# Patient Record
Sex: MALE | Race: WHITE | Employment: OTHER | URBAN - METROPOLITAN AREA
[De-identification: names, ages, dates, MRNs, and addresses within clinical notes are randomized per-mention and may not be internally consistent; named-entity substitution may affect disease eponyms.]

---

## 2017-10-12 ENCOUNTER — OFFICE VISIT (OUTPATIENT)
Dept: INTERNAL MEDICINE CLINIC | Age: 32
End: 2017-10-12

## 2017-10-12 ENCOUNTER — HOSPITAL ENCOUNTER (OUTPATIENT)
Dept: OTHER | Age: 32
Discharge: HOME | End: 2017-10-12
Attending: INTERNAL MEDICINE | Admitting: INTERNAL MEDICINE

## 2017-10-12 VITALS
TEMPERATURE: 98.7 F | HEART RATE: 81 BPM | OXYGEN SATURATION: 98 % | SYSTOLIC BLOOD PRESSURE: 120 MMHG | HEIGHT: 71 IN | WEIGHT: 255.6 LBS | DIASTOLIC BLOOD PRESSURE: 80 MMHG | BODY MASS INDEX: 35.78 KG/M2

## 2017-10-12 DIAGNOSIS — K92.1 BLOOD IN STOOL: ICD-10-CM

## 2017-10-12 DIAGNOSIS — M43.10 SPONDYLOLISTHESIS, ACQUIRED: ICD-10-CM

## 2017-10-12 DIAGNOSIS — L70.0 CYSTIC ACNE: ICD-10-CM

## 2017-10-12 DIAGNOSIS — K60.1 CHRONIC RECTAL FISSURE: ICD-10-CM

## 2017-10-12 DIAGNOSIS — G56.21 NEURITIS OF RIGHT ULNAR NERVE: ICD-10-CM

## 2017-10-12 DIAGNOSIS — G56.21 NEURITIS OF RIGHT ULNAR NERVE: Primary | ICD-10-CM

## 2017-10-12 PROCEDURE — 99204 OFFICE O/P NEW MOD 45 MIN: CPT | Performed by: INTERNAL MEDICINE

## 2017-10-12 RX ORDER — MINOCYCLINE HYDROCHLORIDE 100 MG/1
CAPSULE ORAL
COMMUNITY
Start: 2016-09-21 | End: 2017-11-17 | Stop reason: ALTCHOICE

## 2017-10-12 RX ORDER — BETAMETHASONE DIPROPIONATE 0.5 MG/G
CREAM TOPICAL
COMMUNITY
Start: 2016-06-30 | End: 2018-08-21 | Stop reason: ALTCHOICE

## 2017-10-12 RX ORDER — ADAPALENE AND BENZOYL PEROXIDE 3; 25 MG/G; MG/G
1 GEL TOPICAL DAILY
Qty: 45 G | Refills: 5 | Status: SHIPPED | OUTPATIENT
Start: 2017-10-12 | End: 2017-11-17 | Stop reason: ALTCHOICE

## 2017-10-12 RX ORDER — METHYLPREDNISOLONE 4 MG/1
TABLET ORAL
Qty: 1 KIT | Refills: 0 | Status: SHIPPED | OUTPATIENT
Start: 2017-10-12 | End: 2017-10-18

## 2017-10-12 ASSESSMENT — PATIENT HEALTH QUESTIONNAIRE - PHQ9
SUM OF ALL RESPONSES TO PHQ QUESTIONS 1-9: 0
2. FEELING DOWN, DEPRESSED OR HOPELESS: 0
SUM OF ALL RESPONSES TO PHQ9 QUESTIONS 1 & 2: 0
1. LITTLE INTEREST OR PLEASURE IN DOING THINGS: 0

## 2017-10-12 ASSESSMENT — ENCOUNTER SYMPTOMS
HEMOPTYSIS: 0
EYE PAIN: 0
BACK PAIN: 1
EYE REDNESS: 0
BLURRED VISION: 0
RESPIRATORY NEGATIVE: 1
HEARTBURN: 0
EYE DISCHARGE: 0
COUGH: 0
GASTROINTESTINAL NEGATIVE: 1
NAUSEA: 0
CONSTIPATION: 0
VOMITING: 0
ABDOMINAL PAIN: 0
WHEEZING: 0
SPUTUM PRODUCTION: 0
SHORTNESS OF BREATH: 0
ORTHOPNEA: 0
DIARRHEA: 0
DOUBLE VISION: 0
SORE THROAT: 0
BLOOD IN STOOL: 0
STRIDOR: 0
EYES NEGATIVE: 1
PHOTOPHOBIA: 0

## 2017-10-12 NOTE — PATIENT INSTRUCTIONS
Care instructions adapted under license by [facility] ([facility]). If you have questions about a medical condition or this instruction, always ask your healthcare professional. [address] any warranty or liability for your use of this information.
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
## 2017-11-17 ENCOUNTER — PAT TELEPHONE (OUTPATIENT)
Dept: PREADMISSION TESTING | Age: 32
End: 2017-11-17

## 2017-11-17 VITALS — BODY MASS INDEX: 35 KG/M2 | WEIGHT: 250 LBS | HEIGHT: 71 IN

## 2017-11-17 NOTE — PRE-PROCEDURE INSTRUCTIONS
C-Difficile admission screening and protocol:     * Admitted with diarrhea? no     *Prior history of C-Diff. In last 3 months? no     *Antibiotic use in the past 6-8 weeks?no     *Prior hospitalization or nursing home in the last month?   no

## 2017-11-20 ENCOUNTER — HOSPITAL ENCOUNTER (OUTPATIENT)
Dept: ENDOSCOPY | Age: 32
Discharge: HOME | End: 2017-11-20
Attending: INTERNAL MEDICINE | Admitting: INTERNAL MEDICINE

## 2017-11-20 VITALS
BODY MASS INDEX: 35.39 KG/M2 | HEART RATE: 70 BPM | OXYGEN SATURATION: 97 % | WEIGHT: 252.76 LBS | HEIGHT: 71 IN | SYSTOLIC BLOOD PRESSURE: 137 MMHG | DIASTOLIC BLOOD PRESSURE: 97 MMHG | RESPIRATION RATE: 14 BRPM | TEMPERATURE: 97.8 F

## 2017-11-20 RX ORDER — ONDANSETRON 2 MG/ML
4 INJECTION INTRAMUSCULAR; INTRAVENOUS
Status: ACTIVE | OUTPATIENT
Start: 2017-11-20 | End: 2017-11-20

## 2017-11-20 RX ORDER — SODIUM CHLORIDE 0.9 % (FLUSH) 0.9 %
10 SYRINGE (ML) INJECTION EVERY 12 HOURS SCHEDULED
Status: DISCONTINUED | OUTPATIENT
Start: 2017-11-20 | End: 2017-11-21 | Stop reason: HOSPADM

## 2017-11-20 RX ORDER — MORPHINE SULFATE 2 MG/ML
2 INJECTION, SOLUTION INTRAMUSCULAR; INTRAVENOUS EVERY 5 MIN PRN
Status: DISCONTINUED | OUTPATIENT
Start: 2017-11-20 | End: 2017-11-21 | Stop reason: HOSPADM

## 2017-11-20 RX ORDER — OXYCODONE HYDROCHLORIDE AND ACETAMINOPHEN 5; 325 MG/1; MG/1
2 TABLET ORAL PRN
Status: ACTIVE | OUTPATIENT
Start: 2017-11-20 | End: 2017-11-20

## 2017-11-20 RX ORDER — FENTANYL CITRATE 50 UG/ML
25 INJECTION, SOLUTION INTRAMUSCULAR; INTRAVENOUS EVERY 5 MIN PRN
Status: DISCONTINUED | OUTPATIENT
Start: 2017-11-20 | End: 2017-11-21 | Stop reason: HOSPADM

## 2017-11-20 RX ORDER — MORPHINE SULFATE 2 MG/ML
1 INJECTION, SOLUTION INTRAMUSCULAR; INTRAVENOUS EVERY 5 MIN PRN
Status: DISCONTINUED | OUTPATIENT
Start: 2017-11-20 | End: 2017-11-21 | Stop reason: HOSPADM

## 2017-11-20 RX ORDER — SODIUM CHLORIDE 0.9 % (FLUSH) 0.9 %
10 SYRINGE (ML) INJECTION PRN
Status: DISCONTINUED | OUTPATIENT
Start: 2017-11-20 | End: 2017-11-21 | Stop reason: HOSPADM

## 2017-11-20 RX ORDER — SODIUM CHLORIDE 9 MG/ML
INJECTION, SOLUTION INTRAVENOUS CONTINUOUS
Status: DISCONTINUED | OUTPATIENT
Start: 2017-11-20 | End: 2017-11-21 | Stop reason: HOSPADM

## 2017-11-20 RX ORDER — FENTANYL CITRATE 50 UG/ML
50 INJECTION, SOLUTION INTRAMUSCULAR; INTRAVENOUS EVERY 5 MIN PRN
Status: DISCONTINUED | OUTPATIENT
Start: 2017-11-20 | End: 2017-11-21 | Stop reason: HOSPADM

## 2017-11-20 RX ORDER — OXYCODONE HYDROCHLORIDE AND ACETAMINOPHEN 5; 325 MG/1; MG/1
1 TABLET ORAL PRN
Status: ACTIVE | OUTPATIENT
Start: 2017-11-20 | End: 2017-11-20

## 2017-11-20 RX ORDER — MEPERIDINE HYDROCHLORIDE 25 MG/ML
12.5 INJECTION INTRAMUSCULAR; INTRAVENOUS; SUBCUTANEOUS EVERY 5 MIN PRN
Status: DISCONTINUED | OUTPATIENT
Start: 2017-11-20 | End: 2017-11-21 | Stop reason: HOSPADM

## 2017-11-20 RX ADMIN — SODIUM CHLORIDE: 9 INJECTION, SOLUTION INTRAVENOUS at 07:41

## 2017-11-20 ASSESSMENT — PAIN - FUNCTIONAL ASSESSMENT: PAIN_FUNCTIONAL_ASSESSMENT: 0-10

## 2017-11-20 ASSESSMENT — PAIN SCALES - GENERAL: PAINLEVEL_OUTOF10: 0

## 2017-11-20 NOTE — ANESTHESIA PRE-OP
Department of Anesthesiology  Preprocedure Note       Name:  Brie Monson   Age:  29 y.o.  :  1985                                          MRN:  [id number]         Date:  2017      [facility] Department of Anesthesiology  Pre-Anesthesia Evaluation/Consultation       Name:  Brie Monson                                         Age:  29 y.o. MRN:  [id number]           Procedure (Scheduled):  colonoscopy  Surgeon:  Dr. Kvng Gallegos Inhibitors Rash     Rash, no hx hives or angioedema per patient and he could not recall this    Advil [Ibuprofen] Itching     Patient Active Problem List   Diagnosis    Spondylolisthesis, acquired     History reviewed. No pertinent past medical history. Past Surgical History:   Procedure Laterality Date    DENTAL SURGERY      2 dental implants in incisors    HAND TENDON SURGERY Left 2010    left index and thumb    LAPAROSCOPIC APPENDECTOMY      LASIK  2016    SINUS ENDOSCOPY       Social History   Substance Use Topics    Smoking status: Former Smoker     Types: Cigarettes     Quit date: 2006    Smokeless tobacco: Current User     Types: Chew    Alcohol use Yes      Comment: drinks socially     Medications  Current Outpatient Prescriptions on File Prior to Encounter   Medication Sig Dispense Refill    nitroglycerin (RECTIV) 0.4 % rectal ointment Place 1 inch rectally every 12 hours 1 Tube 2    betamethasone dipropionate (DIPROLENE) 0.05 % cream Apply to affected areas on hands twice daily for 2 weeks then twice daily on weekends only as needed. No current facility-administered medications on file prior to encounter.       Current Outpatient Prescriptions   Medication Sig Dispense Refill    nitroglycerin (RECTIV) 0.4 % rectal ointment Place 1 inch rectally every 12 hours 1 Tube 2    betamethasone dipropionate (DIPROLENE) 0.05 % cream Apply to affected areas on hands twice daily for 2 weeks then twice daily on weekends only as needed.        Current Facility-Administered Medications   Medication Dose Route Frequency Provider Last Rate Last Dose    0.9 % sodium chloride infusion   Intravenous Continuous Pati Akins MD        sodium chloride flush 0.9 % injection 10 mL  10 mL Intravenous 2 times per day Pati Akins MD        sodium chloride flush 0.9 % injection 10 mL  10 mL Intravenous PRN Pati Akins MD        famotidine (PEPCID) injection 20 mg  20 mg Intravenous Once Pati Akins MD        fentaNYL (SUBLIMAZE) injection 25 mcg  25 mcg Intravenous Q5 Min PRN Jean Pierreboaz Carlin MD        fentaNYL (SUBLIMAZE) injection 50 mcg  50 mcg Intravenous Q5 Min PRN Jean Pierre Carlin MD        morphine (PF) injection 1 mg  1 mg Intravenous Q5 Min PRN Jean Pierre MD Tesfaye        morphine (PF) injection 2 mg  2 mg Intravenous Q5 Min PRN Jean Pierre MD Tesfaye        oxyCODONE-acetaminophen (PERCOCET) 5-325 MG per tablet 1 tablet  1 tablet Oral PRN Jean Pierre Carlin MD        Or    oxyCODONE-acetaminophen (PERCOCET) 5-325 MG per tablet 2 tablet  2 tablet Oral PRN Jean Pierre Carlin MD        ondansetron [facility]) injection 4 mg  4 mg Intravenous Once PRN Jean Pierre Carlin MD        meperidine (DEMEROL) injection 12.5 mg  12.5 mg Intravenous Q5 Min PRN Jean Pierre Carlin MD         Vital Signs (Current)   Vitals:    17   BP: 137/86   Pulse: 90   Resp: 16   Temp: 98.4 °F (36.9 °C)   SpO2: 96%     Vital Signs Statistics (for past 48 hrs)     Temp  Av.4 °F (36.9 °C)  Min: 98.4 °F (36.9 °C)   Min taken time: 17  Max: 98.4 °F (36.9 °C)   Max taken time: 17  Pulse  Av  Min: 80   Min taken time: 17  Max: 90   Max taken time: 17  Resp  Av  Min: 12   Min taken time: 17  Max: 16   Max taken time: 17  BP  Min: 137/86   Min taken time: 17 4874  Max: 137/86   Max taken time: Frequency Provider Last Rate Last Dose    0.9 % sodium chloride infusion   Intravenous Continuous Alyce Redman MD        sodium chloride flush 0.9 % injection 10 mL  10 mL Intravenous 2 times per day Alyce Redman MD        sodium chloride flush 0.9 % injection 10 mL  10 mL Intravenous PRN Alyce Redman MD        famotidine (PEPCID) injection 20 mg  20 mg Intravenous Once Alyce Redman MD        fentaNYL (SUBLIMAZE) injection 25 mcg  25 mcg Intravenous Q5 Min PRN Deja Hsu MD        fentaNYL (SUBLIMAZE) injection 50 mcg  50 mcg Intravenous Q5 Min PRN Deja Hsu MD        morphine (PF) injection 1 mg  1 mg Intravenous Q5 Min PRN Deja Hsu MD        morphine (PF) injection 2 mg  2 mg Intravenous Q5 Min PRN Deja Hsu MD        oxyCODONE-acetaminophen (PERCOCET) 5-325 MG per tablet 1 tablet  1 tablet Oral PRN Deja Hsu MD        Or    oxyCODONE-acetaminophen (PERCOCET) 5-325 MG per tablet 2 tablet  2 tablet Oral PRN Deja Hsu MD        ondansetron [facility]) injection 4 mg  4 mg Intravenous Once PRN Deja Hsu MD        meperidine (DEMEROL) injection 12.5 mg  12.5 mg Intravenous Q5 Min PRN Deja Hsu MD           Allergies: Allergies   Allergen Reactions    Roche-2 Inhibitors Rash     Rash, no hx hives or angioedema per patient and he could not recall this    Advil [Ibuprofen] Itching       Problem List:    Patient Active Problem List   Diagnosis Code    Spondylolisthesis, acquired M43.10       Past Medical History:  No past medical history on file.     Past Surgical History:        Procedure Laterality Date    DENTAL SURGERY  2007    2 dental implants in incisors    HAND TENDON SURGERY Left 2010    left index and thumb    LAPAROSCOPIC APPENDECTOMY  2002    LASIK  2016    SINUS ENDOSCOPY  2002       Social History:    Social History   Substance Use Topics    Smoking status: Former Smoker     Types: Cigarettes     Quit date: 11/17/2006    Smokeless tobacco: Current User     Types: Chew    Alcohol use Yes      Comment: drinks socially                                Ready to quit: Not Answered  Counseling given: Not Answered      Vital Signs (Current):   Vitals:    11/20/17 0723   BP: 137/86   Pulse: 90   Resp: 16   Temp: 98.4 °F (36.9 °C)   TempSrc: Temporal   SpO2: 96%   Weight: 252 lb 12.1 oz (114.7 kg)   Height: 5' 11\" (1.803 m)                                              BP Readings from Last 3 Encounters:   11/20/17 137/86   10/12/17 120/80       NPO Status:  prep 0400, see mar                                                                               BMI:   Wt Readings from Last 3 Encounters:   11/20/17 252 lb 12.1 oz (114.7 kg)   11/17/17 250 lb (113.4 kg)   10/12/17 255 lb 9.6 oz (115.9 kg)     Body mass index is 35.25 kg/m². Anesthesia Evaluation  Patient summary reviewed no history of anesthetic complications:   Airway: Mallampati: III  TM distance: >3 FB   Neck ROM: full  Mouth opening: < 3 FB Dental:          Pulmonary:Negative Pulmonary ROS breath sounds clear to auscultation                             Cardiovascular:Negative CV ROS            Rhythm: regular  Rate: normal           Beta Blocker:  Not on Beta Blocker         Neuro/Psych:   Negative Neuro/Psych ROS              GI/Hepatic/Renal:   (+) bowel prep,      (-) hiatal hernia, GERD, PUD, hepatitis and liver disease       Endo/Other: Negative Endo/Other ROS                    Abdominal:           Vascular: negative vascular ROS. Anesthesia Plan      MAC     ASA 2       Induction: intravenous. MIPS: Prophylactic antiemetics administered. Anesthetic plan and risks discussed with patient. Plan discussed with CRNA.               This pre-anesthesia assessment may be used as a history and physical.    DOS STAFF ADDENDUM:    Pt seen and examined, chart reviewed (including anesthesia, drug and allergy history). No interval changes to history and physical examination. Anesthetic plan, risks, benefits, alternatives, and personnel involved discussed with patient. Patient verbalized an understanding and agrees to proceed.       Shirley Huff MD  November 20, 2017  7:35 AM      Shirley Huff MD   11/20/2017

## 2017-11-20 NOTE — PROGRESS NOTES
Pt arrived to pacu from endo asleep on side ab soft. VSS on monitor. O2 off pt breathes easy on RA. Pt resting quietly in bed.

## 2017-11-20 NOTE — ANESTHESIA POST-OP
[facility] Department of Anesthesiology  Post-Anesthesia Note       Name:  Suleman Whitt                                         Age:  29 y.o.   MRN:  [id number]     Last Vitals & Oxygen Saturation: BP (!) 137/97   Pulse 70   Temp 97.8 °F (36.6 °C) (Temporal)   Resp 14   Ht 5' 11\" (1.803 m)   Wt 252 lb 12.1 oz (114.7 kg)   SpO2 97%   BMI 35.25 kg/m²   Patient Vitals for the past 4 hrs:   BP Temp Temp src Pulse Resp SpO2 Height Weight   11/20/17 0931 (!) 137/97 - - 70 14 - - -   11/20/17 0924 123/89 97.8 °F (36.6 °C) Temporal 69 16 97 % - -   11/20/17 0918 - 97.4 °F (36.3 °C) Temporal 64 14 96 % - -   11/20/17 0917 123/82 - - 65 22 97 % - -   11/20/17 0913 - - - 66 14 97 % - -   11/20/17 0912 124/79 - - 68 10 95 % - -   11/20/17 0911 - - - 67 18 96 % - -   11/20/17 0910 - - - 68 22 93 % - -   11/20/17 0907 119/78 - - 73 18 94 % - -   11/20/17 0906 - - - 69 14 95 % - -   11/20/17 0905 - - - 74 16 94 % - -   11/20/17 0904 123/81 - - 70 14 95 % - -   11/20/17 0903 - - - 73 16 95 % - -   11/20/17 0902 123/79 - - - 14 - - -   11/20/17 0901 - - - 69 19 95 % - -   11/20/17 0900 - - - 72 20 96 % - -   11/20/17 0859 - - - 73 16 95 % - -   11/20/17 0858 - - - 75 17 96 % - -   11/20/17 0857 (!) 123/59 - - 80 21 97 % - -   11/20/17 0856 - - - 83 18 97 % - -   11/20/17 0855 - - - 77 14 98 % - -   11/20/17 0854 - 97.5 °F (36.4 °C) Temporal - - - - -   11/20/17 0853 (!) 128/44 - - 81 12 96 % - -   11/20/17 0723 137/86 98.4 °F (36.9 °C) Temporal 90 16 96 % 5' 11\" (1.803 m) 252 lb 12.1 oz (114.7 kg)       Level of consciousness: awake, alert and oriented    Respiratory: stable     Cardiovascular: stable     Hydration: stable     PONV: stable     Post-op pain: adequate analgesia    Post-op assessment: no apparent anesthetic complications and tolerated procedure well    Complications:  none    Rosa Raman MD  November 20, 2017   10:49 AM

## 2017-11-20 NOTE — H&P
Pre-operative History and Physical    Patient: Michael Rush  : 1985  Acct#:     Intended Procedure:  Colonoscopy    HISTORY OF PRESENT ILLNESS:  The patient is a 29 y.o. male  who presents for/due to Rectal bleeding. Past Medical History:    No past medical history on file. Past Surgical History:        Procedure Laterality Date    DENTAL SURGERY      2 dental implants in incisors    HAND TENDON SURGERY Left 2010    left index and thumb    LAPAROSCOPIC APPENDECTOMY  2002    LASIK  2016    SINUS ENDOSCOPY       Medications Prior to Admission:   Current Outpatient Prescriptions on File Prior to Encounter   Medication Sig Dispense Refill    betamethasone dipropionate (DIPROLENE) 0.05 % cream Apply to affected areas on hands twice daily for 2 weeks then twice daily on weekends only as needed.  nitroglycerin (RECTIV) 0.4 % rectal ointment Place 1 inch rectally every 12 hours 1 Tube 2     No current facility-administered medications on file prior to encounter. Allergies:  Roche-2 inhibitors and Advil [ibuprofen]    Social History:   TOBACCO:   reports that he quit smoking about 11 years ago. His smoking use included Cigarettes. His smokeless tobacco use includes Chew. ETOH:   reports that he drinks alcohol. DRUGS:   reports that he does not use drugs. PHYSICAL EXAM:      Vital Signs: There were no vitals taken for this visit. Airway: No stridor or wheezing noted. Good air movement  Pulmonary: without wheezes.   Clear to auscultation  Cardiac:regular rate and rhythm without loud murmurs  Abdomen:soft, nontender,  Bowel sounds present    Pre-Procedure Assessment / Plan:  1) Colonoscopy    ASA Grade:  ASA 2 - Patient with mild systemic disease with no functional limitations  Mallampati Classification:  Class III    Level of Sedation Plan:Deep sedation    Post Procedure plan: Return to same level of care    I assessed the patient and find that the patient is in satisfactory

## 2017-11-20 NOTE — PROGRESS NOTES
1.  Patient is identified using name and date of birth. 2.  The patient is free from signs and symptoms of injury. 3.  The patient receives appropriate medication(s), safely administered during the perioperative period. 4.  The patient had wound/tissuue perfusion consistent with or improved from baseline levels established preoperatively. 5.  The patient is at or returning to normothermia at the conclusion of the immediate postoperative period. 6.  The patient's fluid, electrolyte, and acid base balances are consistent with or improved from baseline levels established preoperatively. 7.  The patient's pulmonary function is consistent with or improved from baseline levels established preoperatively. 8.  The patient's cardiovascular status is consistent with or improved from baseline levels established preoperatively. 9.  The patient/caregiver participates in decisions affecting his or her perioperative care. 10. The patient's care is consistent with the individualized perioperative plan of care. 11. The patient's right to privacy is maintained. 12. The patient is the recipient of competent and ethical care within legal standards of practice. 13.  The patient's value system, lifestyle, ethnicity, and culture are considered, respected, and incorporated in the perioperative plan of care. 14.  The patient demonstrates and/or reports adequate pain control throughout the perioperative period. 15. The patient's neurological status is consistent with or improved from baseline levels established preoperatively. 16.  The patient/caregiver demonstrates knowledge of the expected responses to the operative or invasive procedure. 16.  Patient/caregiver has reduced anxiety. Interventions - familiarize with environment and equipment.   Electronically signed by Manuel Ayala RN on 11/20/2017 at 8:53 AM

## 2017-12-13 ENCOUNTER — TELEPHONE (OUTPATIENT)
Dept: INTERNAL MEDICINE CLINIC | Age: 32
End: 2017-12-13

## 2017-12-13 NOTE — TELEPHONE ENCOUNTER
Pt states the medication below will cost over $600 and he cannot afford this. Asking if Dr Maya Kolb will order another medication for his acne. Explained we could not guarantee what insurance will cover but I would ask if Dr Maya Kolb can suggest another medication and we can see if insurance will cover. Please send to pharmacy below. Adapalene-Benzoyl Peroxide 0.3-2.5 % GEL;  Apply 1 actuation topically daily  Dispense: 45 g; Refill: [address] [facility], [address] [facility],First Floor - F [phone]

## 2017-12-14 NOTE — TELEPHONE ENCOUNTER
Spoke to patient, explained that screening diagnosis can only be used for people over the age of 48. Discussed other issues in regards to him appealing to his insurance company.

## 2018-02-16 ENCOUNTER — TELEPHONE (OUTPATIENT)
Dept: INTERNAL MEDICINE CLINIC | Age: 33
End: 2018-02-16

## 2018-02-16 ENCOUNTER — OFFICE VISIT (OUTPATIENT)
Dept: DERMATOLOGY | Age: 33
End: 2018-02-16

## 2018-02-16 DIAGNOSIS — L73.9 FOLLICULITIS: Primary | ICD-10-CM

## 2018-02-16 DIAGNOSIS — Z87.891 FORMER SMOKER: ICD-10-CM

## 2018-02-16 PROCEDURE — 99202 OFFICE O/P NEW SF 15 MIN: CPT | Performed by: DERMATOLOGY

## 2018-02-16 RX ORDER — DOXYCYCLINE HYCLATE 100 MG
TABLET ORAL
Qty: 60 TABLET | Refills: 1 | Status: SHIPPED | OUTPATIENT
Start: 2018-02-16 | End: 2018-04-20 | Stop reason: SDUPTHER

## 2018-02-16 RX ORDER — CHLORHEXIDINE GLUCONATE 4 G/100ML
SOLUTION TOPICAL
Qty: 236 ML | Refills: 1 | Status: SHIPPED | OUTPATIENT
Start: 2018-02-16 | End: 2018-03-02

## 2018-02-16 RX ORDER — CLINDAMYCIN PHOSPHATE 10 UG/ML
LOTION TOPICAL
Qty: 60 ML | Refills: 1 | Status: SHIPPED | OUTPATIENT
Start: 2018-02-16 | End: 2018-08-21 | Stop reason: ALTCHOICE

## 2018-04-20 ENCOUNTER — OFFICE VISIT (OUTPATIENT)
Dept: DERMATOLOGY | Age: 33
End: 2018-04-20

## 2018-04-20 DIAGNOSIS — L73.9 FOLLICULITIS: ICD-10-CM

## 2018-04-20 DIAGNOSIS — L70.0 ACNE VULGARIS: ICD-10-CM

## 2018-04-20 DIAGNOSIS — L21.9 SEBORRHEIC DERMATITIS: Primary | ICD-10-CM

## 2018-04-20 PROCEDURE — 99214 OFFICE O/P EST MOD 30 MIN: CPT | Performed by: DERMATOLOGY

## 2018-04-20 RX ORDER — DESONIDE 0.5 MG/G
CREAM TOPICAL
Qty: 15 G | Refills: 1 | Status: SHIPPED | OUTPATIENT
Start: 2018-04-20 | End: 2018-04-30 | Stop reason: ALTCHOICE

## 2018-04-20 RX ORDER — KETOCONAZOLE 20 MG/G
CREAM TOPICAL
Qty: 30 G | Refills: 1 | Status: SHIPPED | OUTPATIENT
Start: 2018-04-20 | End: 2018-08-21 | Stop reason: ALTCHOICE

## 2018-04-20 RX ORDER — DOXYCYCLINE HYCLATE 100 MG
TABLET ORAL
Qty: 60 TABLET | Refills: 2 | Status: SHIPPED | OUTPATIENT
Start: 2018-04-20 | End: 2018-07-23 | Stop reason: ALTCHOICE

## 2018-04-23 ENCOUNTER — TELEPHONE (OUTPATIENT)
Dept: DERMATOLOGY | Age: 33
End: 2018-04-23

## 2018-04-23 LAB
GRAM STAIN RESULT: NORMAL
WOUND/ABSCESS: NORMAL

## 2018-04-30 ENCOUNTER — TELEPHONE (OUTPATIENT)
Dept: DERMATOLOGY | Age: 33
End: 2018-04-30

## 2018-04-30 DIAGNOSIS — L21.9 SEBORRHEIC DERMATITIS: Primary | ICD-10-CM

## 2018-07-20 ENCOUNTER — OFFICE VISIT (OUTPATIENT)
Dept: DERMATOLOGY | Age: 33
End: 2018-07-20

## 2018-07-20 VITALS — BODY MASS INDEX: 35.01 KG/M2 | WEIGHT: 251 LBS

## 2018-07-20 DIAGNOSIS — L73.9 FOLLICULITIS: ICD-10-CM

## 2018-07-20 DIAGNOSIS — L70.0 ACNE VULGARIS: ICD-10-CM

## 2018-07-20 DIAGNOSIS — L70.0 CYSTIC ACNE: ICD-10-CM

## 2018-07-20 DIAGNOSIS — L70.0 ACNE VULGARIS: Primary | ICD-10-CM

## 2018-07-20 LAB
A/G RATIO: 1.9 (ref 1.1–2.2)
ALBUMIN SERPL-MCNC: 4.6 G/DL (ref 3.4–5)
ALP BLD-CCNC: 58 U/L (ref 40–129)
ALT SERPL-CCNC: 40 U/L (ref 10–40)
ANION GAP SERPL CALCULATED.3IONS-SCNC: 13 MMOL/L (ref 3–16)
AST SERPL-CCNC: 27 U/L (ref 15–37)
BASOPHILS ABSOLUTE: 0 K/UL (ref 0–0.2)
BASOPHILS RELATIVE PERCENT: 0.6 %
BILIRUB SERPL-MCNC: 0.6 MG/DL (ref 0–1)
BUN BLDV-MCNC: 19 MG/DL (ref 7–20)
CALCIUM SERPL-MCNC: 9.6 MG/DL (ref 8.3–10.6)
CHLORIDE BLD-SCNC: 106 MMOL/L (ref 99–110)
CHOLESTEROL, TOTAL: 216 MG/DL (ref 0–199)
CO2: 25 MMOL/L (ref 21–32)
CREAT SERPL-MCNC: 1 MG/DL (ref 0.9–1.3)
EOSINOPHILS ABSOLUTE: 0 K/UL (ref 0–0.6)
EOSINOPHILS RELATIVE PERCENT: 0.6 %
GFR AFRICAN AMERICAN: >60
GFR NON-AFRICAN AMERICAN: >60
GLOBULIN: 2.4 G/DL
GLUCOSE BLD-MCNC: 98 MG/DL (ref 70–99)
HCT VFR BLD CALC: 46.7 % (ref 40.5–52.5)
HDLC SERPL-MCNC: 46 MG/DL (ref 40–60)
HEMOGLOBIN: 15.8 G/DL (ref 13.5–17.5)
LDL CHOLESTEROL CALCULATED: 146 MG/DL
LYMPHOCYTES ABSOLUTE: 1.7 K/UL (ref 1–5.1)
LYMPHOCYTES RELATIVE PERCENT: 28.1 %
MCH RBC QN AUTO: 31.6 PG (ref 26–34)
MCHC RBC AUTO-ENTMCNC: 33.8 G/DL (ref 31–36)
MCV RBC AUTO: 93.7 FL (ref 80–100)
MONOCYTES ABSOLUTE: 0.5 K/UL (ref 0–1.3)
MONOCYTES RELATIVE PERCENT: 9 %
NEUTROPHILS ABSOLUTE: 3.7 K/UL (ref 1.7–7.7)
NEUTROPHILS RELATIVE PERCENT: 61.7 %
PDW BLD-RTO: 12.9 % (ref 12.4–15.4)
PLATELET # BLD: 211 K/UL (ref 135–450)
PMV BLD AUTO: 8.1 FL (ref 5–10.5)
POTASSIUM SERPL-SCNC: 3.9 MMOL/L (ref 3.5–5.1)
RBC # BLD: 4.99 M/UL (ref 4.2–5.9)
SODIUM BLD-SCNC: 144 MMOL/L (ref 136–145)
TOTAL PROTEIN: 7 G/DL (ref 6.4–8.2)
TRIGL SERPL-MCNC: 121 MG/DL (ref 0–150)
VLDLC SERPL CALC-MCNC: 24 MG/DL
WBC # BLD: 6 K/UL (ref 4–11)

## 2018-07-20 PROCEDURE — 99214 OFFICE O/P EST MOD 30 MIN: CPT | Performed by: DERMATOLOGY

## 2018-07-20 NOTE — PROGRESS NOTES
[facility]) Dermatology  Gerson Gomez DO, [address]       Seema Lopez  1985    29 y.o. male     Date of Visit: 2018    Chief Complaint:   Chief Complaint   Patient presents with    Follow-up    Acne     pt states ther is some improvement but still having break outs (pt unsure of which creams he is currently using)    Other     Folliculitis-no improvement        I was asked to see this patient by Dr. Kaplan ref. provider found. History of Present Illness:  Seema Lopez is a 29 y.o. male who presents with the chief complaint of follow up for the followin. Three-month follow-up for acne and scalp folliculitis. He has noticed decreased cystic like lesions while on doxycycline but still experiences acneiform flares monthly to his back, chest, posterior neck, shoulders, face. Also using sodium sulfacetamide 10-2% foam to his face, shoulders, back daily without much improvement. He would like to discuss oral isotretinoin therapy and start treatment if possible. Review of Systems:  Constitutional: Reports general sense of well-being   Skin: No new or changing moles, no history of keloids or hypertrophic scars. Heme: No abnormal bruising or bleeding. GI: Denies abdominal pain, N/V/D, bloody stools  Psych: denies depression or SI   Endo: denies hx diabetes or hyperglycemia    PMHx: Denies hx of IBD, depression or suicidal ideations. Past Medical History, Family History, Surgical History, Medications and Allergies reviewed. Past Skin Hx:   Patient denies past history of melanoma, NMSC, dysplastic nevi, hx of failure of multiple medications to clear acne    Family History   Problem Relation Age of Onset    Cancer Mother         pre cancer- skin    Colon Cancer Maternal Grandfather     Heart Disease Maternal Uncle      History reviewed. No pertinent past medical history.   Past Surgical History:   Procedure Laterality Date    COLONOSCOPY  2017    Colonoscopy with COMPREHENSIVE METABOLIC PANEL; Future  - LIPID PANEL; Future      2. Folliculitis  stable  Wound culture was negative  -Recommend patient only trim hairs to the occipital scalp, avoid close shave as this can aggravate folliculitis in this location. Oral isotretinoin may help to improve folliculitis   May apply clindamycin lotion to occipital scalp and affected areas at least daily and after his trims/shaves hairs. 25 minutes was spent with the patient discussing dx, tx options, prognosis, tests needed, reviewing labs, answering questions, and follow up at least 50% in face to face consultation. Note is transcribed using voice recognition software. Inadvertent computerized transcription errors may be present. Return in about 28 days (around 8/17/2018), or to 30 days, for accutane f/u.

## 2018-07-23 ENCOUNTER — TELEPHONE (OUTPATIENT)
Dept: DERMATOLOGY | Age: 33
End: 2018-07-23

## 2018-07-23 DIAGNOSIS — L70.0 ACNE VULGARIS: Primary | ICD-10-CM

## 2018-07-23 RX ORDER — ISOTRETINOIN 40 MG/1
40 CAPSULE ORAL DAILY
Qty: 30 CAPSULE | Refills: 0 | Status: SHIPPED | OUTPATIENT
Start: 2018-07-23 | End: 2018-08-21 | Stop reason: SDUPTHER

## 2018-07-23 NOTE — TELEPHONE ENCOUNTER
----- Message from Hoa French DO sent at 7/23/2018 12:15 PM EDT -----  Please remind patient to stop all other acne medications including the doxycyline and topical meds.

## 2018-07-25 ENCOUNTER — HOSPITAL ENCOUNTER (OUTPATIENT)
Dept: OTHER | Age: 33
Discharge: HOME | End: 2018-08-01
Attending: OBSTETRICS & GYNECOLOGY | Admitting: OBSTETRICS & GYNECOLOGY

## 2018-08-21 ENCOUNTER — HOSPITAL ENCOUNTER (OUTPATIENT)
Age: 33
Discharge: HOME | End: 2018-08-21
Payer: COMMERCIAL

## 2018-08-21 ENCOUNTER — TELEPHONE (OUTPATIENT)
Dept: DERMATOLOGY | Age: 33
End: 2018-08-21

## 2018-08-21 ENCOUNTER — OFFICE VISIT (OUTPATIENT)
Dept: DERMATOLOGY | Age: 33
End: 2018-08-21

## 2018-08-21 VITALS — BODY MASS INDEX: 35.73 KG/M2 | WEIGHT: 256.2 LBS

## 2018-08-21 DIAGNOSIS — L70.0 ACNE VULGARIS: ICD-10-CM

## 2018-08-21 DIAGNOSIS — L70.0 ACNE VULGARIS: Primary | ICD-10-CM

## 2018-08-21 DIAGNOSIS — L73.9 FOLLICULITIS: ICD-10-CM

## 2018-08-21 LAB
ALBUMIN SERPL-MCNC: 4.5 G/DL (ref 3.4–5)
ALP BLD-CCNC: 59 U/L (ref 40–129)
ALT SERPL-CCNC: 29 U/L (ref 10–40)
AST SERPL-CCNC: 24 U/L (ref 15–37)
BILIRUB SERPL-MCNC: 0.4 MG/DL (ref 0–1)
BILIRUBIN DIRECT: <0.2 MG/DL (ref 0–0.3)
BILIRUBIN, INDIRECT: NORMAL MG/DL (ref 0–1)
CHOLESTEROL, TOTAL: 251 MG/DL (ref 0–199)
HDLC SERPL-MCNC: 42 MG/DL (ref 40–60)
LDL CHOLESTEROL CALCULATED: 157 MG/DL
TOTAL PROTEIN: 7.5 G/DL (ref 6.4–8.2)
TRIGL SERPL-MCNC: 262 MG/DL (ref 0–150)
VLDLC SERPL CALC-MCNC: 52 MG/DL

## 2018-08-21 PROCEDURE — 36415 COLL VENOUS BLD VENIPUNCTURE: CPT

## 2018-08-21 PROCEDURE — 99213 OFFICE O/P EST LOW 20 MIN: CPT | Performed by: DERMATOLOGY

## 2018-08-21 PROCEDURE — 80076 HEPATIC FUNCTION PANEL: CPT

## 2018-08-21 PROCEDURE — 80061 LIPID PANEL: CPT

## 2018-08-21 RX ORDER — ISOTRETINOIN 40 MG/1
CAPSULE ORAL
Qty: 60 CAPSULE | Refills: 0 | Status: SHIPPED | OUTPATIENT
Start: 2018-08-21 | End: 2018-09-21 | Stop reason: SDUPTHER

## 2018-08-21 NOTE — PROGRESS NOTES
[facility]  Blanchard TAMARA Kitty Jeronim[address]indbonnie, [address]      Meagan Shepherdo  1985    34 y.o. male     Date of Visit: 8/21/2018    Chief Complaint:   Chief Complaint   Patient presents with    Follow-up    Acne     accutane, pt states he is happy with results except for dry skin       History of Present Illness:    Thirty-day follow-up for oral isotretinoin related to recalcitrant acne/folliculitis. Patient's been taking 40 mg daily and is noticing improvement to his acne already. Is experiencing dry skin, not moisturizing regularly. Complains of infrequent nose bleed since starting isotretinoin. Duration of isotretinoin therapy: 1 months. Dose: 40mg daily. Weight: 256lb (116.4kg)    Recent Labs: awaiting patient to complete labs after month 1. Baseline: total chol- 216, LDL-146, TG-121    Cumulative dose thus far: 1200mg    Total cummulative dose Goal:  120-150mg/kg; Based on patient's weight:  33,687-86,405LU    Isotretinoin prescription dates/dose:   7/23/18- 40mg QD      ROS:  Isotretinoin Symptom Survey:       Dry lips: Yes, using chapstick daily       Dry eyes: No         Dry skin: Yes, not moisturizing regularly       Muscle aches or Pains: No      Nose bleeds: Yes, applying neosporin     Frequent Headaches: No    Vision disturbances:No    Trouble with night vision: No     Paronychia: (ingrown toenails/ fingernails): No   Rash: No    Severe sun sensitivity or sunburn: No   Mood swings:  No       Depression: No        Suicidal thoughts: No     Past Medical History, Medications and Allergies reviewed. PMHx: Denies hx of IBD, depression or suicidal ideations, denies hx diabetes or hyperglycemia    History reviewed. No pertinent past medical history.   Past Surgical History:   Procedure Laterality Date    COLONOSCOPY  11/20/2017    Colonoscopy with polypectomy EMR (snare cautery/Elaview lift)    DENTAL SURGERY  2007    2 dental implants in incisors    HAND TENDON SURGERY Left 2010    left index and thumb    LAPAROSCOPIC APPENDECTOMY  2002    LASIK  2016    SINUS ENDOSCOPY  2002       Allergies   Allergen Reactions    Roche-2 Inhibitors Rash     Rash, no hx hives or angioedema per patient and he could not recall this    Advil [Ibuprofen] Itching     Outpatient Prescriptions Marked as Taking for the 8/21/18 encounter (Office Visit) with Kranthiernestina Valeria, DO   Medication Sig Dispense Refill    ISOtretinoin (ACCUTANE) 40 MG chemo capsule Take 1 capsule by mouth daily 30 capsule 0         Physical Examination       The following were examined and determined to be normal: Psych/Neuro, Conjunctivae/eyelids and Gums/teeth/lips. The following were examined and determined to be abnormal: Head/face, Neck, Breast/axilla/chest, Back, RUE and LUE. Well appearing. Areas of skin examined as listed above:  1. Back/shoulders- 2-3 inflammatory papules, no inflammatory pustules, 1-5 closed comedones, chest- 2-3 inflammatory papules, bilateral cheeks and chin with few scattered closed comedones  2. Posterior neck- one follicular nodulocystic lesion    Assessment and Plan     1. Acne vulgaris  Improved, tolerating isotretinoin well overall  Patient is to complete labs after his 1 month course of isotretinoin, awaiting completion and plan to review once received, if stable will plan for increase in dose to isotretinoin 40mg PO BID and confirm patient in IPledge    Discontinue use of Neosporin for nostrils and use only Vaseline daily p.r.n. to help decrease nosebleeds  Recommend Cerave thick moisturizing cream for her body and CeraVe PM moisturizing lotion for face daily p.r.n. Recommend the use of Vaseline for lips for moisturization    Reminded of side effects, no med sharing, no blood donation, no supplemental Vit A, no hair waxing. Continue to have Hepatic function panel and fasting lipid panel checked monthly as needed. RTC in 30 days.       2. Folliculitis  Improved  Continue oral isotretinoin as directed above.    Patient has discontinued use of topical clindamycin to scalp and neck

## 2018-08-22 NOTE — TELEPHONE ENCOUNTER
----- Message from Greson Gomez DO sent at 8/21/2018  5:50 PM EDT -----  Liver function within normal limits. Lipid panel reviewed- increase in triglycerides noted as well as an increase in LDL and total cholesterol. Please verify with patient that he had been fasting for 8 hours prior to lab draw and only drinking water during that timeframe. Labs will need to be monitored closely. Okay to increase daily dose to 40 mg twice daily. Will recheck labs in 1 month and he must be fasting for at least 8 hours. Please encourage patient to eat a healthy diet low in fat, exercise frequently and encourage weight loss.

## 2018-08-22 NOTE — TELEPHONE ENCOUNTER
Spoke to pt, he confirmed he was fasting for at least 8 hours and didn't have anything to drink during that timeframe, pt told to try to follow a healthy diet low in fat, exercise and encouraged weight loss. Pt understood.

## 2018-09-20 ENCOUNTER — HOSPITAL ENCOUNTER (OUTPATIENT)
Age: 33
Discharge: HOME | End: 2018-09-20
Payer: COMMERCIAL

## 2018-09-20 ENCOUNTER — OFFICE VISIT (OUTPATIENT)
Dept: DERMATOLOGY | Age: 33
End: 2018-09-20

## 2018-09-20 VITALS — BODY MASS INDEX: 36.12 KG/M2 | WEIGHT: 259 LBS

## 2018-09-20 DIAGNOSIS — L70.0 ACNE VULGARIS: Primary | ICD-10-CM

## 2018-09-20 DIAGNOSIS — L70.0 ACNE VULGARIS: ICD-10-CM

## 2018-09-20 DIAGNOSIS — L91.8 SKIN TAGS, MULTIPLE ACQUIRED: ICD-10-CM

## 2018-09-20 DIAGNOSIS — L73.9 FOLLICULITIS: ICD-10-CM

## 2018-09-20 LAB
ALBUMIN SERPL-MCNC: 4.8 G/DL (ref 3.4–5)
ALP BLD-CCNC: 64 U/L (ref 40–129)
ALT SERPL-CCNC: 31 U/L (ref 10–40)
AST SERPL-CCNC: 24 U/L (ref 15–37)
BILIRUB SERPL-MCNC: 0.5 MG/DL (ref 0–1)
BILIRUBIN DIRECT: <0.2 MG/DL (ref 0–0.3)
BILIRUBIN, INDIRECT: NORMAL MG/DL (ref 0–1)
CHOLESTEROL, TOTAL: 219 MG/DL (ref 0–199)
HDLC SERPL-MCNC: 43 MG/DL (ref 40–60)
LDL CHOLESTEROL CALCULATED: 151 MG/DL
TOTAL PROTEIN: 7.7 G/DL (ref 6.4–8.2)
TRIGL SERPL-MCNC: 126 MG/DL (ref 0–150)
VLDLC SERPL CALC-MCNC: 25 MG/DL

## 2018-09-20 PROCEDURE — 80061 LIPID PANEL: CPT

## 2018-09-20 PROCEDURE — 80076 HEPATIC FUNCTION PANEL: CPT

## 2018-09-20 PROCEDURE — 99213 OFFICE O/P EST LOW 20 MIN: CPT | Performed by: DERMATOLOGY

## 2018-09-20 PROCEDURE — 36415 COLL VENOUS BLD VENIPUNCTURE: CPT

## 2018-09-20 NOTE — PROGRESS NOTES
[facility]  Oral TAMARA Kumari Savita, [address]      Reyes Adames  1985    34 y.o. male     Date of Visit: 9/20/2018    Chief Complaint:   Chief Complaint   Patient presents with    Follow-up    Acne     accutane follow up - pt states symptoms are improving, just dry       History of Present Illness:    Thirty-day follow-up for oral isotretinoin related to recalcitrant acne/folliculitis. Patient's been taking 40 mg BID and is noticing improvement to his acne. Is experiencing dry skin But using the CeraVe moisturizing products and has noted improvement in dryness. Complains of infrequent nose bleed since starting isotretinoin but has been applying vaseline daily PRN So it is now \"tolerable. \"  He should also states he's noticed a few skin tags to his neck since he started Accutane and is assuming Accutane caused the skin tags. Denies any pain, bleeding, pruritus or irritation to the tags. Duration of isotretinoin therapy: 2 months. Dose: 40mg BID    Weight: 259lb (117.7kg)    Recent Labs: 8/21/18- Hepatic function panel within normal limits, Lipid panel- TGs-262, total chol-251, LDL- 157  Cumulative dose thus far: 3,600mg  July 1200mg   Aug 2400mg    Total cummulative dose Goal:  120-150mg/kg;  Based on patient's weight:  83,638-42,438PK    Isotretinoin prescription dates/dose:   7/23/18- 40mg QD  8/21/18-40mg b.i.d.      ROS:  Isotretinoin Symptom Survey:       Dry lips: Yes, using chapstick daily       Dry eyes: No         Dry skin: Yes, moisturizing with Cerave products      Muscle aches or Pains: No      Nose bleeds: yes, infrequently, using vaseline daily PRN     Frequent Headaches: No    Vision disturbances:No    Trouble with night vision: No     Paronychia: (ingrown toenails/ fingernails): No   Rash: No    Severe sun sensitivity or sunburn: No   Mood swings:  No       Depression: No        Suicidal thoughts: No     Past Medical History, Medications and Allergies reviewed. PMHx: Denies hx of IBD, depression or suicidal ideations, denies hx diabetes or hyperglycemia    History reviewed. No pertinent past medical history. Past Surgical History:   Procedure Laterality Date    COLONOSCOPY  11/20/2017    Colonoscopy with polypectomy EMR (snare cautery/Elaview lift)    DENTAL SURGERY  2007    2 dental implants in incisors    HAND TENDON SURGERY Left 2010    left index and thumb    LAPAROSCOPIC APPENDECTOMY  2002    LASIK  2016    SINUS ENDOSCOPY  2002       Allergies   Allergen Reactions    Roche-2 Inhibitors Rash     Rash, no hx hives or angioedema per patient and he could not recall this    Advil [Ibuprofen] Itching     Outpatient Prescriptions Marked as Taking for the 9/20/18 encounter (Office Visit) with Bethel Copeland,    Medication Sig Dispense Refill    ISOtretinoin (ACCUTANE) 40 MG chemo capsule Take 1 capsule p.o. b.i.d. 60 capsule 0    acetaminophen (APAP EXTRA STRENGTH) 500 MG tablet Take 1 tablet by mouth every 6 hours as needed for Pain 40 tablet 0    nitroglycerin (RECTIV) 0.4 % rectal ointment Place 1 inch rectally every 12 hours 1 Tube 2         Physical Examination       The following were examined and determined to be normal: Psych/Neuro, Conjunctivae/eyelids and Gums/teeth/lips. The following were examined and determined to be abnormal: Head/face, Neck, Breast/axilla/chest, Back, RUE and LUE. Well appearing. Areas of skin examined as listed above:  1. Back/shoulders-1-2 inflammatory papules, no inflammatory pustules or nodulocystic lesions, face, neck, chest-clear   2. Posterior neck- clear    Assessment and Plan     1. Acne vulgaris  Improved, tolerating isotretinoin well overall  Triglycerides increased significantly from baseline (146 to 262) pt states he was fasting at that time and has only had water to drink today.    Awaiting for completion of labs, will plan to review once received, if stable, will plan for continuation of

## 2018-09-21 DIAGNOSIS — L70.0 ACNE VULGARIS: ICD-10-CM

## 2018-09-21 RX ORDER — ISOTRETINOIN 40 MG/1
CAPSULE ORAL
Qty: 60 CAPSULE | Refills: 0 | Status: SHIPPED | OUTPATIENT
Start: 2018-09-21 | End: 2018-10-25 | Stop reason: SDUPTHER

## 2018-10-19 ENCOUNTER — OFFICE VISIT (OUTPATIENT)
Dept: DERMATOLOGY | Age: 33
End: 2018-10-19
Payer: COMMERCIAL

## 2018-10-19 VITALS — WEIGHT: 259 LBS | BODY MASS INDEX: 36.12 KG/M2

## 2018-10-19 DIAGNOSIS — L30.9 ECZEMA, UNSPECIFIED TYPE: ICD-10-CM

## 2018-10-19 DIAGNOSIS — L70.0 ACNE VULGARIS: Primary | ICD-10-CM

## 2018-10-19 PROCEDURE — 99213 OFFICE O/P EST LOW 20 MIN: CPT | Performed by: DERMATOLOGY

## 2018-10-23 DIAGNOSIS — L70.0 ACNE VULGARIS: ICD-10-CM

## 2018-10-23 LAB
ALBUMIN SERPL-MCNC: 4.3 G/DL (ref 3.4–5)
ALP BLD-CCNC: 82 U/L (ref 40–129)
ALT SERPL-CCNC: 28 U/L (ref 10–40)
AST SERPL-CCNC: 21 U/L (ref 15–37)
BILIRUB SERPL-MCNC: <0.2 MG/DL (ref 0–1)
BILIRUBIN DIRECT: <0.2 MG/DL (ref 0–0.3)
BILIRUBIN, INDIRECT: NORMAL MG/DL (ref 0–1)
CHOLESTEROL, TOTAL: 223 MG/DL (ref 0–199)
HDLC SERPL-MCNC: 38 MG/DL (ref 40–60)
LDL CHOLESTEROL CALCULATED: ABNORMAL MG/DL
LDL CHOLESTEROL DIRECT: 142 MG/DL
TOTAL PROTEIN: 6.9 G/DL (ref 6.4–8.2)
TRIGL SERPL-MCNC: 331 MG/DL (ref 0–150)
VLDLC SERPL CALC-MCNC: ABNORMAL MG/DL

## 2018-10-25 ENCOUNTER — TELEPHONE (OUTPATIENT)
Dept: DERMATOLOGY | Age: 33
End: 2018-10-25

## 2018-10-25 DIAGNOSIS — L70.0 ACNE VULGARIS: Primary | ICD-10-CM

## 2018-10-25 RX ORDER — ISOTRETINOIN 40 MG/1
CAPSULE ORAL
Qty: 60 CAPSULE | Refills: 0 | Status: SHIPPED | OUTPATIENT
Start: 2018-10-25 | End: 2018-11-19 | Stop reason: SDUPTHER

## 2018-10-25 NOTE — TELEPHONE ENCOUNTER
Pts Triglycerides elevated again, so asked  pt if he was fasting and had nothing but water to drink and he said he was fasting when he took this test. Told pt we are going to confirm him in iPledge but per JF we have to watch this number closely and strongly suggested pt get his labs done a few days before his visit so we can go over then and send rx in as pt calls every month wanting his refill before we've even had a chance to review his labs as he has them done after his visit.

## 2018-11-16 DIAGNOSIS — L70.0 ACNE VULGARIS: ICD-10-CM

## 2018-11-16 LAB
ALBUMIN SERPL-MCNC: 4.8 G/DL (ref 3.4–5)
ALP BLD-CCNC: 64 U/L (ref 40–129)
ALT SERPL-CCNC: 36 U/L (ref 10–40)
AST SERPL-CCNC: 26 U/L (ref 15–37)
BILIRUB SERPL-MCNC: 0.5 MG/DL (ref 0–1)
BILIRUBIN DIRECT: <0.2 MG/DL (ref 0–0.3)
BILIRUBIN, INDIRECT: NORMAL MG/DL (ref 0–1)
CHOLESTEROL, TOTAL: 228 MG/DL (ref 0–199)
HDLC SERPL-MCNC: 37 MG/DL (ref 40–60)
LDL CHOLESTEROL CALCULATED: 152 MG/DL
TOTAL PROTEIN: 7.1 G/DL (ref 6.4–8.2)
TRIGL SERPL-MCNC: 194 MG/DL (ref 0–150)
VLDLC SERPL CALC-MCNC: 39 MG/DL

## 2018-11-19 ENCOUNTER — OFFICE VISIT (OUTPATIENT)
Dept: DERMATOLOGY | Age: 33
End: 2018-11-19
Payer: COMMERCIAL

## 2018-11-19 VITALS — WEIGHT: 258.4 LBS | BODY MASS INDEX: 36.04 KG/M2

## 2018-11-19 DIAGNOSIS — L73.9 FOLLICULITIS: ICD-10-CM

## 2018-11-19 DIAGNOSIS — L30.8 OTHER ECZEMA: ICD-10-CM

## 2018-11-19 DIAGNOSIS — L85.3 XEROSIS CUTIS: ICD-10-CM

## 2018-11-19 DIAGNOSIS — L70.0 ACNE VULGARIS: Primary | ICD-10-CM

## 2018-11-19 PROCEDURE — 99213 OFFICE O/P EST LOW 20 MIN: CPT | Performed by: DERMATOLOGY

## 2018-11-19 RX ORDER — TRIAMCINOLONE ACETONIDE 1 MG/G
CREAM TOPICAL
Qty: 60 G | Refills: 1 | Status: SHIPPED | OUTPATIENT
Start: 2018-11-19 | End: 2020-01-30

## 2018-11-19 RX ORDER — ISOTRETINOIN 40 MG/1
CAPSULE ORAL
Qty: 60 CAPSULE | Refills: 0 | Status: SHIPPED | OUTPATIENT
Start: 2018-11-19 | End: 2018-12-20 | Stop reason: SDUPTHER

## 2018-12-19 DIAGNOSIS — L70.0 ACNE VULGARIS: ICD-10-CM

## 2018-12-19 LAB
CHOLESTEROL, TOTAL: 241 MG/DL (ref 0–199)
HDLC SERPL-MCNC: 39 MG/DL (ref 40–60)
LDL CHOLESTEROL CALCULATED: 151 MG/DL
TRIGL SERPL-MCNC: 256 MG/DL (ref 0–150)
VLDLC SERPL CALC-MCNC: 51 MG/DL

## 2018-12-20 ENCOUNTER — OFFICE VISIT (OUTPATIENT)
Dept: DERMATOLOGY | Age: 33
End: 2018-12-20
Payer: COMMERCIAL

## 2018-12-20 VITALS — BODY MASS INDEX: 38.04 KG/M2 | WEIGHT: 257.6 LBS

## 2018-12-20 DIAGNOSIS — L30.8 OTHER ECZEMA: ICD-10-CM

## 2018-12-20 DIAGNOSIS — L70.0 ACNE VULGARIS: Primary | ICD-10-CM

## 2018-12-20 DIAGNOSIS — L85.3 XEROSIS CUTIS: ICD-10-CM

## 2018-12-20 DIAGNOSIS — L73.9 FOLLICULITIS: ICD-10-CM

## 2018-12-20 PROCEDURE — 99213 OFFICE O/P EST LOW 20 MIN: CPT | Performed by: DERMATOLOGY

## 2018-12-20 RX ORDER — ISOTRETINOIN 40 MG/1
CAPSULE ORAL
Qty: 60 CAPSULE | Refills: 0 | Status: SHIPPED | OUTPATIENT
Start: 2018-12-20 | End: 2019-01-21 | Stop reason: SDUPTHER

## 2018-12-20 NOTE — PATIENT INSTRUCTIONS
· Apply Vaseline to cracking dry skin  · Continue 2 pills per day  · Lab work before next visit        [address]    1. Do not take more than 1 shower or bath each day. Try to spend 10 minutes or less in the shower/bath. 2. Use a moisturizing soap such as Dove for sensitive skin or Cetaphil. Antibacterial soaps can be too drying. 3. Use soap only on limited areas (face, underarms, groin). Try to avoid using soap on the arms, legs, trunk and back. 4. After showering, pat dry with a towel and generously apply a thick moisturizer all over. 5. If you are able, apply the moisturizer a second time during the day as well. 6. If a prescription cream or ointment has been ordered for you, apply the prescription medication to affected areas after you apply your moisturizer, the moisturizer absorbs best while skin is damp after your shower. 7. When it comes to moisturizers, the thicker the better. Ointments (such as vaseline) are thicker than creams, and creams are thicker than lotions. Suggested over-the-counter moisturizer:      CeraVe Moisturizing Cream in a jar/tub. Apply at least twice a day. Only CeraVe contains the three essential ceramides healthy skin needs.       CeraVe Facial Moisturizing Lotion with SPF 30 is great for the face and they make a night time cream without SPF in it as well

## 2019-01-21 ENCOUNTER — OFFICE VISIT (OUTPATIENT)
Dept: DERMATOLOGY | Age: 34
End: 2019-01-21
Payer: COMMERCIAL

## 2019-01-21 VITALS — BODY MASS INDEX: 37.51 KG/M2 | WEIGHT: 254 LBS

## 2019-01-21 DIAGNOSIS — L70.0 ACNE VULGARIS: ICD-10-CM

## 2019-01-21 DIAGNOSIS — L73.9 FOLLICULITIS: ICD-10-CM

## 2019-01-21 DIAGNOSIS — L30.8 OTHER ECZEMA: ICD-10-CM

## 2019-01-21 DIAGNOSIS — L70.0 ACNE VULGARIS: Primary | ICD-10-CM

## 2019-01-21 LAB
CHOLESTEROL, TOTAL: 211 MG/DL (ref 0–199)
HDLC SERPL-MCNC: 38 MG/DL (ref 40–60)
LDL CHOLESTEROL CALCULATED: 136 MG/DL
TRIGL SERPL-MCNC: 184 MG/DL (ref 0–150)
VLDLC SERPL CALC-MCNC: 37 MG/DL

## 2019-01-21 PROCEDURE — 99213 OFFICE O/P EST LOW 20 MIN: CPT | Performed by: DERMATOLOGY

## 2019-01-21 RX ORDER — ISOTRETINOIN 40 MG/1
CAPSULE ORAL
Qty: 60 CAPSULE | Refills: 0 | Status: SHIPPED | OUTPATIENT
Start: 2019-01-21 | End: 2020-01-30

## 2019-02-18 ENCOUNTER — OFFICE VISIT (OUTPATIENT)
Dept: DERMATOLOGY | Age: 34
End: 2019-02-18
Payer: COMMERCIAL

## 2019-02-18 VITALS — BODY MASS INDEX: 37.63 KG/M2 | WEIGHT: 254.8 LBS

## 2019-02-18 DIAGNOSIS — L73.9 FOLLICULITIS: ICD-10-CM

## 2019-02-18 DIAGNOSIS — L70.0 ACNE VULGARIS: Primary | ICD-10-CM

## 2019-02-18 DIAGNOSIS — L30.8 OTHER ECZEMA: ICD-10-CM

## 2019-02-18 PROCEDURE — 99213 OFFICE O/P EST LOW 20 MIN: CPT | Performed by: DERMATOLOGY

## 2019-10-23 ENCOUNTER — HOSPITAL ENCOUNTER (EMERGENCY)
Age: 34
Discharge: HOME | End: 2019-10-23
Attending: EMERGENCY MEDICINE
Payer: COMMERCIAL

## 2019-10-23 ENCOUNTER — APPOINTMENT (OUTPATIENT)
Dept: CT IMAGING | Age: 34
End: 2019-10-23
Payer: COMMERCIAL

## 2019-10-23 VITALS
RESPIRATION RATE: 12 BRPM | WEIGHT: 256.84 LBS | SYSTOLIC BLOOD PRESSURE: 107 MMHG | HEART RATE: 87 BPM | BODY MASS INDEX: 37.93 KG/M2 | OXYGEN SATURATION: 98 % | TEMPERATURE: 98.3 F | DIASTOLIC BLOOD PRESSURE: 60 MMHG

## 2019-10-23 DIAGNOSIS — A09 DIARRHEA OF INFECTIOUS ORIGIN: Primary | ICD-10-CM

## 2019-10-23 LAB
A/G RATIO: 1.5 (ref 1.1–2.2)
ALBUMIN SERPL-MCNC: 4.8 G/DL (ref 3.4–5)
ALP BLD-CCNC: 67 U/L (ref 40–129)
ALT SERPL-CCNC: 56 U/L (ref 10–40)
ANION GAP SERPL CALCULATED.3IONS-SCNC: 15 MMOL/L (ref 3–16)
AST SERPL-CCNC: 35 U/L (ref 15–37)
BASOPHILS ABSOLUTE: 0 K/UL (ref 0–0.2)
BASOPHILS RELATIVE PERCENT: 0.2 %
BILIRUB SERPL-MCNC: 0.8 MG/DL (ref 0–1)
BILIRUBIN URINE: NEGATIVE
BLOOD, URINE: ABNORMAL
BUN BLDV-MCNC: 18 MG/DL (ref 7–20)
C DIFF TOXIN/ANTIGEN: NORMAL
CALCIUM SERPL-MCNC: 9.3 MG/DL (ref 8.3–10.6)
CHLORIDE BLD-SCNC: 105 MMOL/L (ref 99–110)
CLARITY: CLEAR
CO2: 20 MMOL/L (ref 21–32)
COLOR: ABNORMAL
CREAT SERPL-MCNC: 0.9 MG/DL (ref 0.9–1.3)
EOSINOPHILS ABSOLUTE: 0 K/UL (ref 0–0.6)
EOSINOPHILS RELATIVE PERCENT: 0.2 %
EPITHELIAL CELLS, UA: 0 /HPF (ref 0–5)
GFR AFRICAN AMERICAN: >60
GFR NON-AFRICAN AMERICAN: >60
GLOBULIN: 3.1 G/DL
GLUCOSE BLD-MCNC: 128 MG/DL (ref 70–99)
GLUCOSE URINE: NEGATIVE MG/DL
HCT VFR BLD CALC: 49.7 % (ref 40.5–52.5)
HEMOGLOBIN: 17.2 G/DL (ref 13.5–17.5)
HYALINE CASTS: 3 /LPF (ref 0–8)
KETONES, URINE: NEGATIVE MG/DL
LEUKOCYTE ESTERASE, URINE: NEGATIVE
LIPASE: 14 U/L (ref 13–60)
LYMPHOCYTES ABSOLUTE: 0.2 K/UL (ref 1–5.1)
LYMPHOCYTES RELATIVE PERCENT: 2.2 %
MCH RBC QN AUTO: 32.6 PG (ref 26–34)
MCHC RBC AUTO-ENTMCNC: 34.7 G/DL (ref 31–36)
MCV RBC AUTO: 94 FL (ref 80–100)
MICROSCOPIC EXAMINATION: YES
MONOCYTES ABSOLUTE: 0.5 K/UL (ref 0–1.3)
MONOCYTES RELATIVE PERCENT: 4.3 %
NEUTROPHILS ABSOLUTE: 10 K/UL (ref 1.7–7.7)
NEUTROPHILS RELATIVE PERCENT: 93.1 %
NITRITE, URINE: NEGATIVE
PDW BLD-RTO: 12.8 % (ref 12.4–15.4)
PH UA: 5.5 (ref 5–8)
PLATELET # BLD: 204 K/UL (ref 135–450)
PMV BLD AUTO: 7.5 FL (ref 5–10.5)
POTASSIUM REFLEX MAGNESIUM: 4.4 MMOL/L (ref 3.5–5.1)
PROTEIN UA: NEGATIVE MG/DL
RBC # BLD: 5.29 M/UL (ref 4.2–5.9)
RBC UA: 8 /HPF (ref 0–4)
SODIUM BLD-SCNC: 140 MMOL/L (ref 136–145)
SPECIFIC GRAVITY UA: >1.03 (ref 1–1.03)
TOTAL PROTEIN: 7.9 G/DL (ref 6.4–8.2)
URINE REFLEX TO CULTURE: ABNORMAL
URINE TYPE: ABNORMAL
UROBILINOGEN, URINE: 0.2 E.U./DL
WBC # BLD: 10.7 K/UL (ref 4–11)
WBC UA: 1 /HPF (ref 0–5)

## 2019-10-23 PROCEDURE — 96375 TX/PRO/DX INJ NEW DRUG ADDON: CPT

## 2019-10-23 PROCEDURE — 74177 CT ABD & PELVIS W/CONTRAST: CPT

## 2019-10-23 PROCEDURE — 85025 COMPLETE CBC W/AUTO DIFF WBC: CPT

## 2019-10-23 PROCEDURE — 87505 NFCT AGENT DETECTION GI: CPT

## 2019-10-23 PROCEDURE — 80053 COMPREHEN METABOLIC PANEL: CPT

## 2019-10-23 PROCEDURE — 83690 ASSAY OF LIPASE: CPT

## 2019-10-23 PROCEDURE — 6360000002 HC RX W HCPCS: Performed by: EMERGENCY MEDICINE

## 2019-10-23 PROCEDURE — 2580000003 HC RX 258: Performed by: EMERGENCY MEDICINE

## 2019-10-23 PROCEDURE — 6370000000 HC RX 637 (ALT 250 FOR IP): Performed by: EMERGENCY MEDICINE

## 2019-10-23 PROCEDURE — 81001 URINALYSIS AUTO W/SCOPE: CPT

## 2019-10-23 PROCEDURE — 87449 NOS EACH ORGANISM AG IA: CPT

## 2019-10-23 PROCEDURE — 87425 ROTAVIRUS AG IA: CPT

## 2019-10-23 PROCEDURE — 99284 EMERGENCY DEPT VISIT MOD MDM: CPT

## 2019-10-23 PROCEDURE — 6360000004 HC RX CONTRAST MEDICATION: Performed by: EMERGENCY MEDICINE

## 2019-10-23 PROCEDURE — 87493 C DIFF AMPLIFIED PROBE: CPT

## 2019-10-23 PROCEDURE — 96374 THER/PROPH/DIAG INJ IV PUSH: CPT

## 2019-10-23 PROCEDURE — 87324 CLOSTRIDIUM AG IA: CPT

## 2019-10-23 RX ORDER — ONDANSETRON 4 MG/1
4 TABLET, ORALLY DISINTEGRATING ORAL EVERY 12 HOURS PRN
Qty: 12 TABLET | Refills: 0 | Status: SHIPPED | OUTPATIENT
Start: 2019-10-23 | End: 2020-01-30

## 2019-10-23 RX ORDER — MORPHINE SULFATE 4 MG/ML
4 INJECTION, SOLUTION INTRAMUSCULAR; INTRAVENOUS ONCE
Status: COMPLETED | OUTPATIENT
Start: 2019-10-23 | End: 2019-10-23

## 2019-10-23 RX ORDER — CIPROFLOXACIN 500 MG/1
750 TABLET, FILM COATED ORAL ONCE
Status: COMPLETED | OUTPATIENT
Start: 2019-10-23 | End: 2019-10-23

## 2019-10-23 RX ORDER — DICYCLOMINE HYDROCHLORIDE 10 MG/1
10 CAPSULE ORAL EVERY 6 HOURS PRN
Qty: 20 CAPSULE | Refills: 0 | Status: SHIPPED | OUTPATIENT
Start: 2019-10-23 | End: 2020-01-30

## 2019-10-23 RX ORDER — ONDANSETRON 2 MG/ML
4 INJECTION INTRAMUSCULAR; INTRAVENOUS ONCE
Status: COMPLETED | OUTPATIENT
Start: 2019-10-23 | End: 2019-10-23

## 2019-10-23 RX ORDER — 0.9 % SODIUM CHLORIDE 0.9 %
1000 INTRAVENOUS SOLUTION INTRAVENOUS ONCE
Status: COMPLETED | OUTPATIENT
Start: 2019-10-23 | End: 2019-10-23

## 2019-10-23 RX ADMIN — ONDANSETRON 4 MG: 2 INJECTION INTRAMUSCULAR; INTRAVENOUS at 16:28

## 2019-10-23 RX ADMIN — MORPHINE SULFATE 4 MG: 4 INJECTION, SOLUTION INTRAMUSCULAR; INTRAVENOUS at 16:29

## 2019-10-23 RX ADMIN — IOPAMIDOL 75 ML: 755 INJECTION, SOLUTION INTRAVENOUS at 16:11

## 2019-10-23 RX ADMIN — SODIUM CHLORIDE 1000 ML: 9 INJECTION, SOLUTION INTRAVENOUS at 16:28

## 2019-10-23 RX ADMIN — CIPROFLOXACIN 750 MG: 500 TABLET, FILM COATED ORAL at 18:09

## 2019-10-23 ASSESSMENT — ENCOUNTER SYMPTOMS
APNEA: 0
RECTAL PAIN: 0
STRIDOR: 0
WHEEZING: 0
ABDOMINAL DISTENTION: 0
ABDOMINAL PAIN: 1
DIARRHEA: 1
EYE ITCHING: 0
BACK PAIN: 0
CHEST TIGHTNESS: 0
COUGH: 0
PHOTOPHOBIA: 0
SHORTNESS OF BREATH: 0
EYE DISCHARGE: 0
VOMITING: 1
CONSTIPATION: 0
COLOR CHANGE: 0
EYE REDNESS: 0
NAUSEA: 1
CHOKING: 0
ANAL BLEEDING: 0
EYE PAIN: 0
BLOOD IN STOOL: 0

## 2019-10-23 ASSESSMENT — PAIN DESCRIPTION - INTENSITY: RATING_2: 5

## 2019-10-23 ASSESSMENT — PAIN DESCRIPTION - DURATION: DURATION_2: INTERMITTENT

## 2019-10-23 ASSESSMENT — PAIN DESCRIPTION - DESCRIPTORS
DESCRIPTORS_2: HEADACHE
DESCRIPTORS: DISCOMFORT

## 2019-10-23 ASSESSMENT — PAIN DESCRIPTION - LOCATION
LOCATION: ABDOMEN
LOCATION_2: HEAD

## 2019-10-23 ASSESSMENT — PAIN DESCRIPTION - PAIN TYPE
TYPE: ACUTE PAIN
TYPE_2: ACUTE PAIN

## 2019-10-23 ASSESSMENT — PAIN DESCRIPTION - ONSET: ONSET: ON-GOING

## 2019-10-23 ASSESSMENT — PAIN SCALES - GENERAL
PAINLEVEL_OUTOF10: 5
PAINLEVEL_OUTOF10: 5

## 2019-10-24 LAB
C. DIFFICILE TOXIN MOLECULAR: NORMAL
GI BACTERIAL PATHOGENS BY PCR: NORMAL

## 2019-10-25 LAB — ROTAVIRUS ANTIGEN: NEGATIVE

## 2020-04-12 ENCOUNTER — NURSE TRIAGE (OUTPATIENT)
Dept: OTHER | Facility: CLINIC | Age: 35
End: 2020-04-12

## 2020-04-12 ENCOUNTER — HOSPITAL ENCOUNTER (EMERGENCY)
Age: 35
Discharge: HOME | End: 2020-04-12
Payer: COMMERCIAL

## 2020-04-12 ENCOUNTER — APPOINTMENT (OUTPATIENT)
Dept: GENERAL RADIOLOGY | Age: 35
End: 2020-04-12
Payer: COMMERCIAL

## 2020-04-12 VITALS
SYSTOLIC BLOOD PRESSURE: 114 MMHG | OXYGEN SATURATION: 98 % | TEMPERATURE: 98 F | DIASTOLIC BLOOD PRESSURE: 49 MMHG | BODY MASS INDEX: 35.7 KG/M2 | HEIGHT: 71 IN | HEART RATE: 63 BPM | WEIGHT: 255 LBS | RESPIRATION RATE: 16 BRPM

## 2020-04-12 LAB
A/G RATIO: 1.5 (ref 1.1–2.2)
ALBUMIN SERPL-MCNC: 4.1 G/DL (ref 3.4–5)
ALP BLD-CCNC: 58 U/L (ref 40–129)
ALT SERPL-CCNC: 23 U/L (ref 10–40)
ANION GAP SERPL CALCULATED.3IONS-SCNC: 12 MMOL/L (ref 3–16)
AST SERPL-CCNC: 20 U/L (ref 15–37)
BASOPHILS ABSOLUTE: 0 K/UL (ref 0–0.2)
BASOPHILS RELATIVE PERCENT: 0.4 %
BILIRUB SERPL-MCNC: 0.5 MG/DL (ref 0–1)
BUN BLDV-MCNC: 13 MG/DL (ref 7–20)
C-REACTIVE PROTEIN: 3.2 MG/L (ref 0–5.1)
CALCIUM SERPL-MCNC: 9.4 MG/DL (ref 8.3–10.6)
CHLORIDE BLD-SCNC: 103 MMOL/L (ref 99–110)
CO2: 21 MMOL/L (ref 21–32)
CREAT SERPL-MCNC: 0.7 MG/DL (ref 0.9–1.3)
EOSINOPHILS ABSOLUTE: 0.1 K/UL (ref 0–0.6)
EOSINOPHILS RELATIVE PERCENT: 0.5 %
GFR AFRICAN AMERICAN: >60
GFR NON-AFRICAN AMERICAN: >60
GLOBULIN: 2.8 G/DL
GLUCOSE BLD-MCNC: 103 MG/DL (ref 70–99)
HCT VFR BLD CALC: 47.3 % (ref 40.5–52.5)
HEMOGLOBIN: 16 G/DL (ref 13.5–17.5)
LYMPHOCYTES ABSOLUTE: 1.3 K/UL (ref 1–5.1)
LYMPHOCYTES RELATIVE PERCENT: 12.4 %
MCH RBC QN AUTO: 31.4 PG (ref 26–34)
MCHC RBC AUTO-ENTMCNC: 33.8 G/DL (ref 31–36)
MCV RBC AUTO: 92.8 FL (ref 80–100)
MONOCYTES ABSOLUTE: 0.8 K/UL (ref 0–1.3)
MONOCYTES RELATIVE PERCENT: 7.4 %
NEUTROPHILS ABSOLUTE: 8.3 K/UL (ref 1.7–7.7)
NEUTROPHILS RELATIVE PERCENT: 79.3 %
PDW BLD-RTO: 13 % (ref 12.4–15.4)
PLATELET # BLD: 185 K/UL (ref 135–450)
PMV BLD AUTO: 7.5 FL (ref 5–10.5)
POTASSIUM REFLEX MAGNESIUM: 4.5 MMOL/L (ref 3.5–5.1)
RBC # BLD: 5.09 M/UL (ref 4.2–5.9)
REASON FOR REJECTION: NORMAL
REJECTED TEST: NORMAL
SEDIMENTATION RATE, ERYTHROCYTE: 5 MM/HR (ref 0–15)
SODIUM BLD-SCNC: 136 MMOL/L (ref 136–145)
TOTAL PROTEIN: 6.9 G/DL (ref 6.4–8.2)
URIC ACID, SERUM: 7.3 MG/DL (ref 3.5–7.2)
WBC # BLD: 10.5 K/UL (ref 4–11)

## 2020-04-12 PROCEDURE — 85652 RBC SED RATE AUTOMATED: CPT

## 2020-04-12 PROCEDURE — 99283 EMERGENCY DEPT VISIT LOW MDM: CPT

## 2020-04-12 PROCEDURE — 86140 C-REACTIVE PROTEIN: CPT

## 2020-04-12 PROCEDURE — 73562 X-RAY EXAM OF KNEE 3: CPT

## 2020-04-12 PROCEDURE — 6370000000 HC RX 637 (ALT 250 FOR IP): Performed by: NURSE PRACTITIONER

## 2020-04-12 PROCEDURE — 84550 ASSAY OF BLOOD/URIC ACID: CPT

## 2020-04-12 PROCEDURE — 85025 COMPLETE CBC W/AUTO DIFF WBC: CPT

## 2020-04-12 PROCEDURE — 80053 COMPREHEN METABOLIC PANEL: CPT

## 2020-04-12 PROCEDURE — 73560 X-RAY EXAM OF KNEE 1 OR 2: CPT

## 2020-04-12 RX ORDER — HYDROCODONE BITARTRATE AND ACETAMINOPHEN 5; 325 MG/1; MG/1
1 TABLET ORAL ONCE
Status: COMPLETED | OUTPATIENT
Start: 2020-04-12 | End: 2020-04-12

## 2020-04-12 RX ORDER — HYDROCODONE BITARTRATE AND ACETAMINOPHEN 10; 325 MG/1; MG/1
1 TABLET ORAL EVERY 8 HOURS PRN
Qty: 12 TABLET | Refills: 0 | Status: SHIPPED | OUTPATIENT
Start: 2020-04-12 | End: 2020-04-14

## 2020-04-12 RX ORDER — PREDNISONE 20 MG/1
40 TABLET ORAL DAILY
Qty: 10 TABLET | Refills: 0 | Status: SHIPPED | OUTPATIENT
Start: 2020-04-12 | End: 2020-04-12 | Stop reason: CLARIF

## 2020-04-12 RX ORDER — ALLOPURINOL 100 MG/1
100 TABLET ORAL DAILY
Qty: 30 TABLET | Refills: 0 | Status: SHIPPED | OUTPATIENT
Start: 2020-04-12 | End: 2020-04-13 | Stop reason: ALTCHOICE

## 2020-04-12 RX ADMIN — HYDROCODONE BITARTRATE AND ACETAMINOPHEN 1 TABLET: 5; 325 TABLET ORAL at 10:49

## 2020-04-12 RX ADMIN — HYDROCODONE BITARTRATE AND ACETAMINOPHEN 1 TABLET: 5; 325 TABLET ORAL at 08:21

## 2020-04-12 ASSESSMENT — PAIN SCALES - GENERAL
PAINLEVEL_OUTOF10: 7

## 2020-04-12 ASSESSMENT — PAIN DESCRIPTION - ORIENTATION
ORIENTATION: RIGHT
ORIENTATION: RIGHT

## 2020-04-12 ASSESSMENT — PAIN DESCRIPTION - DESCRIPTORS: DESCRIPTORS: THROBBING

## 2020-04-12 ASSESSMENT — PAIN DESCRIPTION - LOCATION
LOCATION: KNEE
LOCATION: KNEE

## 2020-04-12 ASSESSMENT — PAIN DESCRIPTION - PAIN TYPE: TYPE: ACUTE PAIN

## 2020-04-12 ASSESSMENT — PAIN - FUNCTIONAL ASSESSMENT: PAIN_FUNCTIONAL_ASSESSMENT: PREVENTS OR INTERFERES SOME ACTIVE ACTIVITIES AND ADLS

## 2020-04-12 ASSESSMENT — PAIN DESCRIPTION - FREQUENCY
FREQUENCY: CONTINUOUS
FREQUENCY: CONTINUOUS

## 2020-04-12 ASSESSMENT — PAIN DESCRIPTION - PROGRESSION: CLINICAL_PROGRESSION: GRADUALLY WORSENING

## 2020-04-12 ASSESSMENT — PAIN DESCRIPTION - ONSET: ONSET: SUDDEN

## 2020-04-12 NOTE — ED PROVIDER NOTES
MEDICAL HISTORY   History reviewed. No pertinent past medical history. SURGICAL HISTORY     Past Surgical History:   Procedure Laterality Date    COLONOSCOPY  2017    Colonoscopy with polypectomy EMR (snare cautery/Elaview lift)    DENTAL SURGERY      2 dental implants in incisors    HAND TENDON SURGERY Left 2010    left index and thumb    LAPAROSCOPIC APPENDECTOMY      LASIK      SINUS ENDOSCOPY           [address]       Discharge Medication List as of 2020 10:43 AM            ALLERGIES     Roche-2 inhibitors and Advil [ibuprofen]    FAMILYHISTORY       Family History   Problem Relation Age of Onset    Cancer Mother         pre cancer- skin    Colon Cancer Maternal Grandfather     Heart Disease Maternal Uncle           SOCIAL HISTORY       Social History     Tobacco Use    Smoking status: Former Smoker     Packs/day: 1.00     Years: 5.00     Pack years: 5.00     Types: Cigarettes     Last attempt to quit: 2006     Years since quittin.4    Smokeless tobacco: Current User     Types: Chew   Substance Use Topics    Alcohol use: Yes     Comment: drinks socially    Drug use: No       SCREENINGS             PHYSICAL EXAM    (up to 7 for level 4, 8 or more for level 5)     ED Triage Vitals [20 0743]   Enc Vitals Group      /75      Pulse 75      Resp 16      Temp 98.7 °F (37.1 °C)      Temp Source Oral      SpO2 97 %      Weight 255 lb (115.7 kg)      Height 5' 11\" (1.803 m)         Physical Exam  Vitals signs and nursing note reviewed. Constitutional:       General: He is awake. Appearance: Normal appearance. He is well-developed and normal weight. HENT:      Head: Normocephalic and atraumatic. Nose: Nose normal.   Eyes:      General:         Right eye: No discharge. Left eye: No discharge. Neck:      Musculoskeletal: Normal range of motion.    Cardiovascular:      Pulses:           Dorsalis pedis pulses are 2+ on the right studies reviewed. (See chart for details)   -  Patient seen and evaluated in the emergency department. -  Triage and nursing notes reviewed and incorporated. -  Old chart records reviewed and incorporated. -  Patient case discussed with attending physician, although Dr. Sheeba Westbrook was available for consultation.   -  Differential diagnosis includes:  Septic arthritis, gout, knee effusion, arthritis, tendinitis, vs Covid-19  -  Work-up included:  See above CBC, CMP, uric acid, ESR, CRP, x-ray right knee  -  ED treatment included:   Norco  - Consults: none  -  Results discussed with patient. Labs show ESR 5. CBC shows absolute neutrophil 8.3.. CMP with glucose 103, creatinine 0.7. CRP 3.2. Uric acid 7.3. X-ray right knee shows no acute osseous abnormality. Pt was given strict return precautions. The patient is agreeable with plan of care and disposition.  -  Disposition:  home  I offered patient NSAIDs for inflammation he said he cannot take as he is allergic to them as it causes a rash and itching. I offered an Ace wrap for support and comfort and crutches as needed for ambulation. Patient declined both of these. He was instructed to follow-up with PCP for further evaluation and testing. FINAL IMPRESSION      1.  Acute gout of right knee, unspecified cause          DISPOSITION/PLAN   DISPOSITION        PATIENT REFERREDTO:  Christine Juares MD  [facility] [address] [place] [address]  [phone]    Call in 2 days  As needed, If symptoms worsen    [facility]  [address] [facility]  [phone]  Go to   As needed      DISCHARGE MEDICATIONS:  Discharge Medication List as of 4/12/2020 10:43 AM      START taking these medications    Details   allopurinol (ZYLOPRIM) 100 MG tablet Take 1 tablet by mouth daily, Disp-30 tablet, R-0Print      HYDROcodone-acetaminophen (NORCO)  MG per tablet Take 1 tablet by mouth every 8 hours as needed for Pain for up to 12 doses. Intended supply: 30 days, Disp-12 tablet, R-0Print             DISCONTINUED MEDICATIONS:  Discharge Medication List as of 4/12/2020 10:43 AM            Periodic Controlled Substance Monitoring: Possible medication side effects, risk of tolerance/dependence & alternative treatments discussed.  (Tawanna Severance, APRN - CNP)    (Please note that portions of this note were completed with a voice recognition program.  Efforts were made to edit the dictations but occasionally words are mis-transcribed.)    Tawanna Severance, APRN - CNP (electronically signed)            Tawanna Severance, APRN - CNP  04/12/20 1404

## 2020-04-12 NOTE — ED TRIAGE NOTES
Suddenly developed right knee pain last evening. Knee red, swollen with constant throbbing. Denies injury.

## 2020-04-17 ENCOUNTER — TELEPHONE (OUTPATIENT)
Dept: ADMINISTRATIVE | Age: 35
End: 2020-04-17

## 2020-04-20 NOTE — TELEPHONE ENCOUNTER
Received DENIAL for Colchicine 0.6MG tablets. Denial letter attached. Please notify patient. Thank you.

## 2020-06-17 ENCOUNTER — OFFICE VISIT (OUTPATIENT)
Dept: RHEUMATOLOGY | Age: 35
End: 2020-06-17
Payer: COMMERCIAL

## 2020-06-17 VITALS
HEART RATE: 74 BPM | BODY MASS INDEX: 37.98 KG/M2 | HEIGHT: 69 IN | DIASTOLIC BLOOD PRESSURE: 73 MMHG | SYSTOLIC BLOOD PRESSURE: 105 MMHG | TEMPERATURE: 98.3 F | WEIGHT: 256.4 LBS

## 2020-06-17 DIAGNOSIS — M25.561 PAIN AND SWELLING OF RIGHT KNEE: ICD-10-CM

## 2020-06-17 DIAGNOSIS — M25.461 PAIN AND SWELLING OF RIGHT KNEE: ICD-10-CM

## 2020-06-17 LAB
A/G RATIO: 2.4 (ref 1.1–2.2)
ALBUMIN SERPL-MCNC: 4.7 G/DL (ref 3.4–5)
ALP BLD-CCNC: 61 U/L (ref 40–129)
ALT SERPL-CCNC: 28 U/L (ref 10–40)
ANION GAP SERPL CALCULATED.3IONS-SCNC: 9 MMOL/L (ref 3–16)
AST SERPL-CCNC: 25 U/L (ref 15–37)
BASOPHILS ABSOLUTE: 0 K/UL (ref 0–0.2)
BASOPHILS RELATIVE PERCENT: 0.6 %
BILIRUB SERPL-MCNC: 0.5 MG/DL (ref 0–1)
BUN BLDV-MCNC: 16 MG/DL (ref 7–20)
C-REACTIVE PROTEIN: 3 MG/L (ref 0–5.1)
CALCIUM SERPL-MCNC: 9.5 MG/DL (ref 8.3–10.6)
CHLORIDE BLD-SCNC: 103 MMOL/L (ref 99–110)
CO2: 25 MMOL/L (ref 21–32)
CREAT SERPL-MCNC: 0.8 MG/DL (ref 0.9–1.3)
EOSINOPHILS ABSOLUTE: 0 K/UL (ref 0–0.6)
EOSINOPHILS RELATIVE PERCENT: 0.9 %
GFR AFRICAN AMERICAN: >60
GFR NON-AFRICAN AMERICAN: >60
GLOBULIN: 2 G/DL
GLUCOSE BLD-MCNC: 90 MG/DL (ref 70–99)
HCT VFR BLD CALC: 46.1 % (ref 40.5–52.5)
HEMOGLOBIN: 15.8 G/DL (ref 13.5–17.5)
HEPATITIS B CORE IGM ANTIBODY: NORMAL
HEPATITIS B SURFACE ANTIGEN INTERPRETATION: NORMAL
HEPATITIS C ANTIBODY INTERPRETATION: NORMAL
LYMPHOCYTES ABSOLUTE: 1.6 K/UL (ref 1–5.1)
LYMPHOCYTES RELATIVE PERCENT: 27.6 %
MCH RBC QN AUTO: 32 PG (ref 26–34)
MCHC RBC AUTO-ENTMCNC: 34.2 G/DL (ref 31–36)
MCV RBC AUTO: 93.7 FL (ref 80–100)
MONOCYTES ABSOLUTE: 0.6 K/UL (ref 0–1.3)
MONOCYTES RELATIVE PERCENT: 9.8 %
NEUTROPHILS ABSOLUTE: 3.5 K/UL (ref 1.7–7.7)
NEUTROPHILS RELATIVE PERCENT: 61.1 %
PDW BLD-RTO: 13.2 % (ref 12.4–15.4)
PLATELET # BLD: 193 K/UL (ref 135–450)
PMV BLD AUTO: 7.8 FL (ref 5–10.5)
POTASSIUM SERPL-SCNC: 4.4 MMOL/L (ref 3.5–5.1)
RBC # BLD: 4.92 M/UL (ref 4.2–5.9)
RHEUMATOID FACTOR: <10 IU/ML
SEDIMENTATION RATE, ERYTHROCYTE: 6 MM/HR (ref 0–15)
SODIUM BLD-SCNC: 137 MMOL/L (ref 136–145)
TOTAL PROTEIN: 6.7 G/DL (ref 6.4–8.2)
TSH REFLEX FT4: 0.88 UIU/ML (ref 0.27–4.2)
URIC ACID, SERUM: 7.3 MG/DL (ref 3.5–7.2)
WBC # BLD: 5.7 K/UL (ref 4–11)

## 2020-06-17 PROCEDURE — 99244 OFF/OP CNSLTJ NEW/EST MOD 40: CPT | Performed by: INTERNAL MEDICINE

## 2020-06-17 RX ORDER — PREDNISONE 10 MG/1
TABLET ORAL
Qty: 21 TABLET | Refills: 0 | Status: SHIPPED | OUTPATIENT
Start: 2020-06-17 | End: 2020-12-03 | Stop reason: ALTCHOICE

## 2020-06-17 NOTE — LETTER
[facility]  [address] [id number]  Phone: [phone]  Fax: [phone]    Tara Peters MD        June 17, 2020     Yinka Márquez MD  No address on file  Myron Quiles MD  No address on file    Patient: Khoa Wan  MR Number: <[id number]>  YOB: 1985  Date of Visit: 6/17/2020    Dear Dr. Myron Quiles:    Thank you for your referral. Progress note attached in visit summary. If you have questions, please do not hesitate to call me. I look forward to following Carlo Venegas along with you.     Sincerely,        Tara Peters MD

## 2020-06-17 NOTE — PROGRESS NOTES
2020  Patient Name: Bhupinder Chahal  : 1985  Medical Record: <[id number]>    MEDICATIONS  Current Outpatient Medications   Medication Sig Dispense Refill    predniSONE (DELTASONE) 10 MG tablet Take 4  tabs daily for 2 days, 3 tabs daily for 2 days, 2 tabs daily for 2 days, 1 tab daily for 2 days, 0.5 tab for 2 days and stop 21 tablet 0     No current facility-administered medications for this visit. ALLERGIES  Allergies   Allergen Reactions    Roche-2 Inhibitors Rash     Rash, no hx hives or angioedema per patient and he could not recall this    Advil [Ibuprofen] Itching         Comments  No specialty comments available. REFERRING PHYSICIAN:Osito Saxena MD    HISTORY OF PRESENT ILLNESS  Bhupinder Chahal is a 30 y.o. male with no significant past medical history who is being seen for follow up evaluation of  right knee pain and swelling. Symptoms started  when he woke up in the middle of the night with pain, swelling and stiffness in the right knee associated with erythema and warmth. He had difficulty with walking and could not bear weight on the right knee. He denies fever, weight loss, swollen glands. He denies preceding viral infection, urinary tract infection, diarrhea, urinary discharge. He denies psoriasis, inflammatory bowel disease, inflammatory back pain, dactylitis, enthesitis, tenosynovitis or uveitis. He denies any other joint pain or swelling or stiffness. he went to the ER and had right knee x-ray which was normal.  He had work-up which showed elevated uric acid of 7.3. He was told that he has gout and was discharged home. He went to his primary care physician and was given right knee corticosteroid injection on  which did not work right away. His symptoms lasted for 2 weeks. He has not had any flareups since. He denies family history of gout. He drinks 1 glass of beer every day.   HPI  Review of Systems    REVIEW OF SYSTEMS:   Constitutional: No Number of children: 1    Years of education: Not on file    Highest education level: Bachelor's degree (e.g., BA, AB, BS)   Occupational History    Occupation: Construction   Social Needs    Financial resource strain: Not hard at all   Yasmine-Abdelrahman insecurity     Worry: Never true     Inability: Never true   [employer] needs     Medical: No     Non-medical: No   Tobacco Use    Smoking status: Former Smoker     Packs/day: 1.00     Years: 5.00     Pack years: 5.00     Types: Cigarettes     Last attempt to quit: 2006     Years since quittin.5    Smokeless tobacco: Current User     Types: Chew   Substance and Sexual Activity    Alcohol use:  Yes     Alcohol/week: 5.0 standard drinks     Types: 5 Cans of beer per week     Comment: drinks socially    Drug use: No    Sexual activity: Yes     Partners: Female   Lifestyle    Physical activity     Days per week: Not on file     Minutes per session: Not on file    Stress: Not on file   Relationships    Social connections     Talks on phone: Not on file     Gets together: Not on file     Attends Sabianism service: Not on file     Active member of club or organization: Not on file     Attends meetings of clubs or organizations: Not on file     Relationship status: Not on file    Intimate partner violence     Fear of current or ex partner: Not on file     Emotionally abused: Not on file     Physically abused: Not on file     Forced sexual activity: Not on file   Other Topics Concern    Not on file   Social History Narrative    Not on file     Family History   Problem Relation Age of Onset    Cancer Mother         pre cancer- skin    Colon Cancer Maternal Grandfather     Heart Disease Maternal Uncle          PHYSICAL EXAM   Vitals:    20 0853   BP: 105/73   Pulse: 74   Temp: 98.3 °F (36.8 °C)   Weight: 256 lb 6.4 oz (116.3 kg)   Height: 5' 9.32\" (1.761 m)     Physical Exam  Constitutional:  Well developed, well nourished, no acute distress, non-toxic appearance   Musculoskeletal:    Ambulates without assistance, normal gait  Neck: Full ROM, no tenderness,supple   Upper Extremities        Shoulder: Full ROM, no crepitus        Elbow:  Full ROM, no synovitis, no deformity        Wrist: Full ROM, no synovitis, no deformity, no ulnar deviation        Fingers: No sclerodactly, no active raynaud's, no ulcers. MCPs: No synovitis, no deformity             PIPs:  No synovitis, no deformity             DIPs: No synovitis, no deformity             Nails: No pitting, no telangiectasias. Lower Extremities        Hip: Full ROM, no tenderness to palpation        Knee: Full ROM, no erythema/swelling/laxity/crepitus. Patellanot ballotable. Ankle: Full ROM, no swelling or erythema        MTPs: No swelling or erythema  Back- no tenderness. Eyes:  PERRL, extra ocular movements intact, conjunctiva normal   HEENT:  Atraumatic, normocephalic, external ears normal, oropharynx moist, no pharyngeal exudates. Respiratory:  No respiratory distress  GI:  Soft, nondistended, normal bowel sounds, nontender, noorganomegaly, no mass, no rebound, no guarding   :  No costovertebral angle tenderness   Integument:  Well hydrated, no rash or telangiectasias  Lymphatic:  No lymphadenopathy noted   Neurologic:   Alert & oriented x 3, CN 2-12 normal, no focal deficits noted. Sensations Intact. Muscle strength 5/5 proximallyand distally in upper and lower extremities.    Psychiatric:  Speech and behavior appropriate           LABS AND IMAGING  Outside data reviewed and in HPI    Lab Results   Component Value Date    WBC 10.5 04/12/2020    RBC 5.09 04/12/2020    HGB 16.0 04/12/2020    HCT 47.3 04/12/2020     04/12/2020    MCV 92.8 04/12/2020    MCH 31.4 04/12/2020    MCHC 33.8 04/12/2020    RDW 13.0 04/12/2020    LYMPHOPCT 12.4 04/12/2020    MONOPCT 7.4 04/12/2020    BASOPCT 0.4 04/12/2020    MONOSABS 0.8 04/12/2020    LYMPHSABS 1.3 04/12/2020    EOSABS 0.1 present.

## 2020-06-18 LAB
ANTI-NUCLEAR ANTIBODY (ANA): NEGATIVE
CYCLIC CITRULLINATED PEPTIDE ANTIBODY IGG: <0.5 U/ML (ref 0–2.9)

## 2020-06-20 LAB — HLA B27: NEGATIVE

## 2020-06-30 ENCOUNTER — VIRTUAL VISIT (OUTPATIENT)
Dept: RHEUMATOLOGY | Age: 35
End: 2020-06-30
Payer: COMMERCIAL

## 2020-06-30 PROCEDURE — 99213 OFFICE O/P EST LOW 20 MIN: CPT | Performed by: INTERNAL MEDICINE

## 2020-06-30 NOTE — PROGRESS NOTES
psoriasis, inflammatory bowel disease, inflammatory back pain, dactylitis, enthesitis, tenosynovitis or uveitis. He denies any other joint pain or swelling or stiffness. he went to the ER and had right knee x-ray which was normal.  He had work-up which showed elevated uric acid of 7.3. He was told that he has gout and was discharged home. He went to his primary care physician and was given right knee corticosteroid injection on April 27 which did not work right away. His symptoms lasted for 2 weeks. He has not had any flareups since. He denies family history of gout. He drinks 1 glass of beer every day. Interim history: He presents for follow-up of right knee pain and swelling. He has not had any flareups since last 2 weeks ago. He denies any other joint pain or swelling or stiffness. His blood work came back negative for rheumatoid arthritis. HLA-B27 negative. He has slightly elevated uric acid of 7.3. He denies any psoriasis, inflammatory bowel disease, inflammatory back pain, dactylitis, enthesitis, tenosynovitis or uveitis. He denies any preceding viral infection or urinary tract infection or diarrhea. He denies any recent tick bites or travel. HPI  Review of Systems    REVIEW OF SYSTEMS:   Constitutional: No unanticipated weight loss or fevers. No fatigue and malaise. Integumentary: No rash, photosensitivity, malar rash, livedo reticularis, alopecia and Raynaud's symptoms, sclerodactyly, skin tightening  Eyes: negative for visual disturbance and persistent redness, discharge from eyes   ENT: - No tinnitus, loss of hearing, vertigo, or recurrent ear infections.  - No history of nasal/oral ulcers. - No history of dry eyes/dry mouth  Cardiovascular: No history of pericarditis, chest pain or murmur or palpitations  Respiratory: No shortness of breath, cough or history of interstitial lung disease. No history of pleurisy. No history of tuberculosis or atypical infections.   Gastrointestinal: No history of heart burn, dysphagia or esophageal dysmotility. No change in bowel habits or any inflammatory bowel disease. Genitourinary: No history renal disease, miscarriages. Hematologic/Lymphatic: No abnormal bruising or bleeding, blood clots or swollen lymph nodes. Neurological: No history of headaches, seizure or focal weakness. No history of neuropathies, paresthesias or hyperesthesias, facial droop, diplopia  Psychiatric: No history of bipolar disease, anxiety, depression  Endocrine: Denies any polyuria, polydipsia and osteoporosis  Allergic/Immunologic: No nasal congestion or hives. I have reviewed patients Past medical History, Social History and Family History as mentioned in her chart and this remains unchanged fromprevious. History reviewed. No pertinent past medical history. Past Surgical History:   Procedure Laterality Date    COLONOSCOPY  2017    Colonoscopy with polypectomy EMR (snare cautery/Elaview lift)    DENTAL SURGERY      2 dental implants in incisors    HAND TENDON SURGERY Left 2010    left index and thumb    LAPAROSCOPIC APPENDECTOMY      LASIK  2016    SINUS ENDOSCOPY       Social History     Socioeconomic History    Marital status:      Spouse name: Michael Zazueta Number of children: 1    Years of education: Not on file    Highest education level: Bachelor's degree (e.g., BA, AB, BS)   Occupational History    Occupation: Construction   Social Needs    Financial resource strain: Not hard at all   Sherman-Abdelrahman insecurity     Worry: Never true     Inability: Never true   [place] needs     Medical: No     Non-medical: No   Tobacco Use    Smoking status: Former Smoker     Packs/day: 1.00     Years: 5.00     Pack years: 5.00     Types: Cigarettes     Last attempt to quit: 2006     Years since quittin.6    Smokeless tobacco: Current User     Types: Chew   Substance and Sexual Activity    Alcohol use:  Yes     Alcohol/week: 5.0

## 2020-06-30 NOTE — PATIENT INSTRUCTIONS
Patient Education        Purine-Restricted Diet: Care Instructions  Your Care Instructions     Purines are substances that are found in some foods. Your body turns purines into uric acid. High levels of uric acid can cause gout, which is a form of arthritis that causes pain and inflammation in joints. You may be able to help control the amount of uric acid in your body by limiting high-purine foods in your diet. Follow-up care is a key part of your treatment and safety. Be sure to make and go to all appointments, and call your doctor if you are having problems. It's also a good idea to know your test results and keep a list of the medicines you take. How can you care for yourself at home? · Plan your meals and snacks around foods that are low in purines and are safe for you to eat. These foods include:  ? Green vegetables and tomatoes. ? Fruits. ? Whole-grain breads, rice, and cereals. ? Eggs, peanut butter, and nuts. ? Low-fat milk, cheese, and other milk products. ? Popcorn. ? Gelatin desserts, chocolate, cocoa, and cakes and sweets, in small amounts. · You can eat certain foods that are medium-high in purines, but eat them only once in a while. These foods include:  ? Legumes, such as dried beans and dried peas. You can have 1 cup cooked legumes each day. ? Asparagus, cauliflower, spinach, mushrooms, and green peas. ? Fish and seafood (other than very high-purine seafood). ? Oatmeal, wheat bran, and wheat germ. · Limit very high-purine foods, including:  ? Organ meats, such as liver, kidneys, sweetbreads, and brains. ? Meats, including veronica, beef, pork, and lamb. ? Game meats and any other meats in large amounts. ? Anchovies, sardines, herring, mackerel, and scallops. ? Gravy. ? Beer. Where can you learn more? Go to [email or web address] and sign in to your [employer] account.  Enter F448 in the [facility] box to learn more about \"Purine-Restricted Diet: Care

## 2020-07-14 ENCOUNTER — OFFICE VISIT (OUTPATIENT)
Dept: PRIMARY CARE CLINIC | Age: 35
End: 2020-07-14
Payer: COMMERCIAL

## 2020-07-14 PROCEDURE — 99211 OFF/OP EST MAY X REQ PHY/QHP: CPT | Performed by: NURSE PRACTITIONER

## 2020-07-14 NOTE — PROGRESS NOTES
Ronny Dos Santos received a viral test for COVID-19. They were educated on isolation and quarantine as appropriate. For any symptoms, they were directed to seek care from their PCP, given contact information to establish with a doctor, directed to an urgent care or the emergency room.

## 2020-07-16 ENCOUNTER — TELEMEDICINE (OUTPATIENT)
Dept: FAMILY MEDICINE CLINIC | Age: 35
End: 2020-07-16
Payer: COMMERCIAL

## 2020-07-16 PROCEDURE — 99203 OFFICE O/P NEW LOW 30 MIN: CPT | Performed by: FAMILY MEDICINE

## 2020-07-16 NOTE — PROGRESS NOTES
2020    TELEHEALTH EVALUATION -- Audio/Visual (During CJBCN-62 public health emergency)    HPI:    Yang Harman (:  1985) has requested an audio/video evaluation for the following concern(s):  Patient is wanting to establish due to needing a physician when ill. 1. Establish care-  Patient is needing a new pcp, his last one left the area. Overall, patient feels well, denied tobacco use, alcohol use, or drug use. 2.  Hx of pain aqnd swelling- right leg/knee at times, possibly due to  gout/suedogout- patient follows with Rheumatologist for this, stable today. 3. Ibs/ Colon polyp- has seen Dr. Rashid Greene in the past, had  Colonoscopy that revealed several polyps, was informed next colonoscopy should be every three years, no change in bowel habits, no change in caliber of stool, no change in consistently of stool. Today, chest pain, sob, n, v, d    Review of Systems   Constitutional: Negative for activity change, fatigue and unexpected weight change. HENT: Negative for congestion, ear pain, rhinorrhea and sore throat. Respiratory: Negative for shortness of breath. Cardiovascular: Negative for chest pain. Gastrointestinal: Negative for abdominal pain, diarrhea, nausea and vomiting. Musculoskeletal: Positive for arthralgias and gait problem. Skin: Negative for color change and rash. Psychiatric/Behavioral: Negative for dysphoric mood. The patient is not nervous/anxious. Prior to Visit Medications    Medication Sig Taking?  Authorizing Provider   predniSONE (DELTASONE) 10 MG tablet Take 4  tabs daily for 2 days, 3 tabs daily for 2 days, 2 tabs daily for 2 days, 1 tab daily for 2 days, 0.5 tab for 2 days and stop  Patient not taking: Reported on 2020  Jeanine Claude, MD       Social History     Tobacco Use    Smoking status: Former Smoker     Packs/day: 1.00     Years: 5.00     Pack years: 5.00     Types: Cigarettes     Last attempt to quit: 2006     Years since quittin.6    Smokeless tobacco: Current User     Types: Chew   Substance Use Topics    Alcohol use: Yes     Alcohol/week: 5.0 standard drinks     Types: 5 Cans of beer per week     Comment: drinks socially    Drug use: No        Allergies   Allergen Reactions    Roche-2 Inhibitors Rash     Rash, no hx hives or angioedema per patient and he could not recall this    Advil [Ibuprofen] Itching       PHYSICAL EXAMINATION:  [ INSTRUCTIONS:  \"[x]\" Indicates a positive item  \"[]\" Indicates a negative item  -- DELETE ALL ITEMS NOT EXAMINED]  Vital Signs: (As obtained by patient/caregiver or practitioner observation)  See Chart  Blood pressure-  Heart rate-    Respiratory rate-    Temperature-  Pulse oximetry-     Constitutional: [x] Appears well-developed and well-nourished [x] No apparent distress      [] Abnormal-   Mental status  [x] Alert and awake  [x] Oriented to person/place/time [x]Able to follow commands      Eyes:  EOM    [x]  Normal  [] Abnormal-  Sclera  [x]  Normal  [] Abnormal -         Discharge [x]  None visible  [] Abnormal -    HENT:   [x] Normocephalic, atraumatic.   [x] Abnormal   [] Mouth/Throat: Mucous membranes are moist.     External Ears [x] Normal  [] Abnormal-     Neck: [x] No visualized mass     Pulmonary/Chest: [x] Respiratory effort normal.  [x] No visualized signs of difficulty breathing or respiratory distress        [] Abnormal-      Musculoskeletal:   [] Normal gait with no signs of ataxia         [x] Normal range of motion of neck        [] Abnormal-       Neurological:        [x] No Facial Asymmetry (Cranial nerve 7 motor function) (limited exam to video visit)          [x] No gaze palsy        [] Abnormal-         Skin:        [x] No significant exanthematous lesions or discoloration noted on facial skin         [] Abnormal-            Psychiatric:       [x] Normal Affect [x] No Hallucinations        [] Abnormal-     Other pertinent observable physical exam findings- ASSESSMENT/PLAN:  1. Encounter to establish care  Care established  Medications reviewed  Questions answered  Labs obtained  RTC in three months for follow up. 2. Polyp of colon, unspecified part of colon, unspecified type  Stable  Continue with medication  Keep appointments with specialist.   Sharon Veliz questions  - KAMILLE Feliciano DO, Gastroenterology, [facility]    3. Knee swelling  Stable  Continue with medication  Keep appointments with specialist.   Sharon Veliz questions      Return in about 3 months (around 10/16/2020) for for physical 30 minutes. Taj Vásquez is a 30 y.o. male being evaluated by a Virtual Visit (video visit) encounter to address concerns as mentioned above. A caregiver was present when appropriate. Due to this being a TeleHealth encounter (During GPFDF-11 public health emergency), evaluation of the following organ systems was limited: Vitals/Constitutional/EENT/Resp/CV/GI//MS/Neuro/Skin/Heme-Lymph-Imm. Pursuant to the emergency declaration under the [address] [place] [address], [address] [place] [address] authority and the [employer] Act, this Virtual Visit was conducted with patient's (and/or legal guardian's) consent, to reduce the patient's risk of exposure to COVID-19 and provide necessary medical care. The patient (and/or legal guardian) has also been advised to contact this office for worsening conditions or problems, and seek emergency medical treatment and/or call 911 if deemed necessary. Patient identification was verified at the start of the visit: Yes    Total time spent on this encounter: Not billed by time    Services were provided through a video synchronous discussion virtually to substitute for in-person clinic visit. Patient and provider were located at their individual homes.     --Jamel Graham DO on 7/19/2020 at 4:33 PM    An electronic signature was used to authenticate this note.

## 2020-07-17 ENCOUNTER — TELEPHONE (OUTPATIENT)
Dept: FAMILY MEDICINE CLINIC | Age: 35
End: 2020-07-17

## 2020-07-17 NOTE — TELEPHONE ENCOUNTER
----- Message from [facility], DO sent at 7/16/2020 11:43 AM EDT -----  Please make sure patient schedules for a yearly physical at his convenience.     Thank you, Dr. Cristofer Greco

## 2020-07-18 LAB
SARS-COV-2: NOT DETECTED
SOURCE: NORMAL

## 2020-07-19 ASSESSMENT — ENCOUNTER SYMPTOMS
RHINORRHEA: 0
ABDOMINAL PAIN: 0
VOMITING: 0
SORE THROAT: 0
NAUSEA: 0
DIARRHEA: 0
COLOR CHANGE: 0
SHORTNESS OF BREATH: 0

## 2020-08-11 ENCOUNTER — OFFICE VISIT (OUTPATIENT)
Dept: PRIMARY CARE CLINIC | Age: 35
End: 2020-08-11
Payer: COMMERCIAL

## 2020-08-11 PROCEDURE — 99211 OFF/OP EST MAY X REQ PHY/QHP: CPT | Performed by: NURSE PRACTITIONER

## 2020-08-13 LAB
SARS-COV-2: NOT DETECTED
SOURCE: NORMAL

## 2020-12-01 ENCOUNTER — OFFICE VISIT (OUTPATIENT)
Dept: PRIMARY CARE CLINIC | Age: 35
End: 2020-12-01
Payer: COMMERCIAL

## 2020-12-01 PROCEDURE — 99211 OFF/OP EST MAY X REQ PHY/QHP: CPT | Performed by: NURSE PRACTITIONER

## 2020-12-01 NOTE — PROGRESS NOTES
Patient presented to [facility] drive up clinic for preop testing. Patient was swabbed and given information advising them to remain isolated until procedure date.

## 2020-12-03 NOTE — PROGRESS NOTES
[address] [facility] time__1000__________        Surgery time____1130________    Take the following medications with a sip of water: Follow your MD/Surgeons pre-procedure instructions regarding your medications    Do not eat or drink anything after 12:00 midnight prior to your surgery. This includes water chewing gum, mints and ice chips. You may brush your teeth and gargle the morning of your surgery, but do not swallow the water     Please see your family doctor/pediatrician for a history and physical and/or concerning medications. Bring any test results/reports from your physicians office. If you are under the care of a heart doctor or specialist doctor, please be aware that you may be asked to them for clearance    You may be asked to stop blood thinners such as Coumadin, Plavix, Fragmin, Lovenox, etc., or any anti-inflammatories such as:  Aspirin, Ibuprofen, Advil, Naproxen prior to your surgery. We also ask that you stop any OTC medications such as fish oil, vitamin E, glucosamine, garlic, Multivitamins, COQ 10, etc. MAY TAKE TYLENOL    We ask that you do not smoke 24 hours prior to surgery NO TOBACCO  We ask that you do not  drink any alcoholic beverages 24 hours prior to surgery     You must make arrangements for a responsible adult to take you home after your surgery. For your safety you will not be allowed to leave alone or drive yourself home. Your surgery will be cancelled if you do not have a ride home. Also for your safety, it is strongly suggested that someone stay with you the first 24 hours after your surgery. A parent or legal guardian must accompany a child scheduled for surgery and plan to stay at the hospital until the child is discharged. Please do not bring other children with you. For your comfort, please wear simple loose fitting clothing to the hospital.  Please do not bring valuables.     Do not wear any make-up or nail polish on your fingers or toes      For your safety, please do not wear any jewelry or body piercing's on the day of surgery. All jewelry must be removed. If you have dentures, they will be removed before going to operating room. For your convenience, we will provide you with a container. If you wear contact lenses or glasses, they will be removed, please bring a case for them. If you have a living will and a durable power of  for healthcare, please bring in a copy. As part of our patient safety program to minimize surgical site infections, we ask you to do the following:    · Please notify your surgeon if you develop any illness between         now and the  day of your surgery. · This includes a cough, cold, fever, sore throat, nausea,         or vomiting, and diarrhea, etc.  ·  Please notify your surgeon if you experience dizziness, shortness         of breath or blurred vision between now and the time of your surgery. Do not shave your operative site 96 hours prior to surgery. For face and neck surgery, men may use an electric razor 48 hours   prior to surgery. You may shower the night before surgery or the morning of   your surgery with an antibacterial soap. You will need to bring a photo ID and insurance card    [facility] has an onsite pharmacy, would you like to utilize our pharmacy     If you will be staying overnight and use a C-pap machine, please bring   your C-pap to hospital     Our goal is to provide you with excellent care, therefore, visitors will be limited to two(2) in the room at a time so that we may focus on providing this care for you. Please contact pre-admission testing if you have any further questions.                  [facility] phone number:  [phone] PAT fax number:  [phone]  Please note these are generalized instructions for all surgical cases, you may be provided with more specific instructions according to your surgery.

## 2020-12-03 NOTE — PROGRESS NOTES
Preoperative Screening for Elective Surgery/Invasive Procedures While COVID-19 present in the community     Have you tested positive or have been told to self-isolate for COVID-19 like symptoms within the past 28 days? NO   Do you currently have any of the following symptoms? NO  o Fever >100.0 F or 99.9 F in immunocompromised patients? NO  o New onset cough, shortness of breath or difficulty breathing? NO  o New onset sore throat, myalgia (muscle aches and pains), headache, loss of taste/smell or diarrhea? NO   Have you had a potential exposure to COVID-19 within the past 14 days by:  o Close contact with a confirmed case? NO  o Close contact with a healthcare worker,  or essential infrastructure worker (grocery store, [employer], gas station, public utilities or transportation)? NO  o Do you reside in a congregate setting such as; skilled nursing facility, adult home, correctional facility, homeless shelter or other institutional setting? NO  o Have you had recent travel to a known COVID-19 hotspot? NO    Indicate if the patient has a positive screen by answering yes to one or more of the above questions. Patients who test positive or screen positive prior to surgery or on the day of surgery should be evaluated in conjunction with the surgeon/proceduralist/anesthesiologist to determine the urgency of the procedure.

## 2020-12-04 ENCOUNTER — ANESTHESIA EVENT (OUTPATIENT)
Dept: ENDOSCOPY | Age: 35
End: 2020-12-04
Payer: COMMERCIAL

## 2020-12-04 LAB — SARS-COV-2, NAA: NOT DETECTED

## 2020-12-07 ENCOUNTER — ANESTHESIA (OUTPATIENT)
Dept: ENDOSCOPY | Age: 35
End: 2020-12-07
Payer: COMMERCIAL

## 2020-12-07 ENCOUNTER — HOSPITAL ENCOUNTER (OUTPATIENT)
Age: 35
Setting detail: OUTPATIENT SURGERY
Discharge: HOME | End: 2020-12-07
Attending: INTERNAL MEDICINE | Admitting: INTERNAL MEDICINE
Payer: COMMERCIAL

## 2020-12-07 VITALS — SYSTOLIC BLOOD PRESSURE: 120 MMHG | DIASTOLIC BLOOD PRESSURE: 73 MMHG | OXYGEN SATURATION: 97 % | TEMPERATURE: 98.6 F

## 2020-12-07 VITALS
WEIGHT: 249.12 LBS | RESPIRATION RATE: 20 BRPM | DIASTOLIC BLOOD PRESSURE: 89 MMHG | OXYGEN SATURATION: 94 % | TEMPERATURE: 98 F | BODY MASS INDEX: 34.88 KG/M2 | HEART RATE: 96 BPM | HEIGHT: 71 IN | SYSTOLIC BLOOD PRESSURE: 136 MMHG

## 2020-12-07 PROCEDURE — 2580000003 HC RX 258: Performed by: ANESTHESIOLOGY

## 2020-12-07 PROCEDURE — 3700000000 HC ANESTHESIA ATTENDED CARE: Performed by: INTERNAL MEDICINE

## 2020-12-07 PROCEDURE — 7100000011 HC PHASE II RECOVERY - ADDTL 15 MIN: Performed by: INTERNAL MEDICINE

## 2020-12-07 PROCEDURE — 7100000010 HC PHASE II RECOVERY - FIRST 15 MIN: Performed by: INTERNAL MEDICINE

## 2020-12-07 PROCEDURE — 3609027000 HC COLONOSCOPY: Performed by: INTERNAL MEDICINE

## 2020-12-07 PROCEDURE — 7100000000 HC PACU RECOVERY - FIRST 15 MIN: Performed by: INTERNAL MEDICINE

## 2020-12-07 PROCEDURE — 6360000002 HC RX W HCPCS

## 2020-12-07 PROCEDURE — 2709999900 HC NON-CHARGEABLE SUPPLY: Performed by: INTERNAL MEDICINE

## 2020-12-07 PROCEDURE — 3700000001 HC ADD 15 MINUTES (ANESTHESIA): Performed by: INTERNAL MEDICINE

## 2020-12-07 PROCEDURE — 2500000003 HC RX 250 WO HCPCS

## 2020-12-07 RX ORDER — LIDOCAINE HYDROCHLORIDE 20 MG/ML
INJECTION, SOLUTION EPIDURAL; INFILTRATION; INTRACAUDAL; PERINEURAL PRN
Status: DISCONTINUED | OUTPATIENT
Start: 2020-12-07 | End: 2020-12-07 | Stop reason: SDUPTHER

## 2020-12-07 RX ORDER — SODIUM CHLORIDE 0.9 % (FLUSH) 0.9 %
10 SYRINGE (ML) INJECTION PRN
Status: DISCONTINUED | OUTPATIENT
Start: 2020-12-07 | End: 2020-12-07 | Stop reason: HOSPADM

## 2020-12-07 RX ORDER — SODIUM CHLORIDE 0.9 % (FLUSH) 0.9 %
10 SYRINGE (ML) INJECTION EVERY 12 HOURS SCHEDULED
Status: DISCONTINUED | OUTPATIENT
Start: 2020-12-07 | End: 2020-12-07 | Stop reason: HOSPADM

## 2020-12-07 RX ORDER — SODIUM CHLORIDE 9 MG/ML
INJECTION, SOLUTION INTRAVENOUS CONTINUOUS
Status: DISCONTINUED | OUTPATIENT
Start: 2020-12-07 | End: 2020-12-07 | Stop reason: HOSPADM

## 2020-12-07 RX ORDER — PROPOFOL 10 MG/ML
INJECTION, EMULSION INTRAVENOUS PRN
Status: DISCONTINUED | OUTPATIENT
Start: 2020-12-07 | End: 2020-12-07 | Stop reason: SDUPTHER

## 2020-12-07 RX ORDER — PROPOFOL 10 MG/ML
INJECTION, EMULSION INTRAVENOUS CONTINUOUS PRN
Status: DISCONTINUED | OUTPATIENT
Start: 2020-12-07 | End: 2020-12-07 | Stop reason: SDUPTHER

## 2020-12-07 RX ORDER — ONDANSETRON 2 MG/ML
4 INJECTION INTRAMUSCULAR; INTRAVENOUS
Status: DISCONTINUED | OUTPATIENT
Start: 2020-12-07 | End: 2020-12-07 | Stop reason: HOSPADM

## 2020-12-07 RX ADMIN — PROPOFOL 200 MCG/KG/MIN: 10 INJECTION, EMULSION INTRAVENOUS at 11:09

## 2020-12-07 RX ADMIN — PROPOFOL 50 MG: 10 INJECTION, EMULSION INTRAVENOUS at 11:11

## 2020-12-07 RX ADMIN — PROPOFOL 50 MG: 10 INJECTION, EMULSION INTRAVENOUS at 11:09

## 2020-12-07 RX ADMIN — LIDOCAINE HYDROCHLORIDE 50 MG: 20 INJECTION, SOLUTION EPIDURAL; INFILTRATION; INTRACAUDAL; PERINEURAL at 11:08

## 2020-12-07 RX ADMIN — PROPOFOL 50 MG: 10 INJECTION, EMULSION INTRAVENOUS at 11:14

## 2020-12-07 RX ADMIN — SODIUM CHLORIDE: 9 INJECTION, SOLUTION INTRAVENOUS at 10:27

## 2020-12-07 ASSESSMENT — PULMONARY FUNCTION TESTS
PIF_VALUE: 1
PIF_VALUE: 0
PIF_VALUE: 1

## 2020-12-07 ASSESSMENT — PAIN SCALES - GENERAL
PAINLEVEL_OUTOF10: 0

## 2020-12-07 ASSESSMENT — PAIN - FUNCTIONAL ASSESSMENT: PAIN_FUNCTIONAL_ASSESSMENT: 0-10

## 2020-12-07 ASSESSMENT — ENCOUNTER SYMPTOMS: SHORTNESS OF BREATH: 0

## 2020-12-07 ASSESSMENT — LIFESTYLE VARIABLES: SMOKING_STATUS: 0

## 2020-12-07 NOTE — PROGRESS NOTES
Patient tolerated snack, VSS, abd soft and non-tender. Discharge instructions discussed with patient, verbalized understanding. Patient ready for discharge.

## 2020-12-07 NOTE — OP NOTE
appearing with no polyps, masses, inflammation, or other significant abnormalities. The scope was then withdrawn into the rectum and retroflexed. The retroflexed view of the anal verge and rectum demonstrates normal rectum. The scope was straightened, the colon was decompressed and the scope was withdrawn from the patient. The patient tolerated the procedure well and was taken to the PACU in good condition. Estimated Blood Loss (mL): None    Complications: None    Specimens: * No specimens in log *    Impression:  See post-procedure diagnoses. Recommendations:   1. Recommend repeat colonoscopy in 5 years for surveillance purposes with prior history of adenomatous polyps.      CRISTY Ascencio [address]  12/7/2020  [phone]

## 2020-12-07 NOTE — ANESTHESIA PRE PROCEDURE
Department of Anesthesiology  Preprocedure Note       Name:  Sarai Wells   Age:  37 y.o.  :  1985                                          MRN:  [id number]         Date:  2020      Surgeon: Deep Lazo):  Radha Cardona MD    Procedure: Procedure(s):  COLONOSCOPY    Medications prior to admission:   Prior to Admission medications    Not on File       Current medications:    Current Facility-Administered Medications   Medication Dose Route Frequency Provider Last Rate Last Dose    0.9 % sodium chloride infusion   Intravenous Continuous Alf De La Torre  mL/hr at 20 1027      sodium chloride flush 0.9 % injection 10 mL  10 mL Intravenous 2 times per day Alf De La Torre MD        sodium chloride flush 0.9 % injection 10 mL  10 mL Intravenous PRN Alf De La Torre MD           Allergies: Allergies   Allergen Reactions    Roche-2 Inhibitors Rash     Rash, no hx hives or angioedema per patient and he could not recall this    Advil [Ibuprofen] Itching     HEAD ITCHES       Problem List:    Patient Active Problem List   Diagnosis Code    Spondylolisthesis, acquired M43.10    Crushing injury of right hand S67. 21XA       Past Medical History:  History reviewed. No pertinent past medical history. Past Surgical History:        Procedure Laterality Date    COLONOSCOPY  2017    Colonoscopy with polypectomy EMR (snare cautery/Elaview lift)    DENTAL SURGERY  2007    2 dental implants in incisors    HAND TENDON SURGERY Left 2010    left index and thumb    LAPAROSCOPIC APPENDECTOMY  2002    LASIK  2016    SINUS ENDOSCOPY         Social History:    Social History     Tobacco Use    Smoking status: Former Smoker     Packs/day: 1.00     Years: 5.00     Pack years: 5.00     Types: Cigarettes     Last attempt to quit: 2006     Years since quittin.0    Smokeless tobacco: Current User     Types: Chew   Substance Use Topics    Alcohol use:  Yes Alcohol/week: 5.0 standard drinks     Types: 5 Cans of beer per week     Comment: drinks socially                                Ready to quit: Not Answered  Counseling given: Not Answered      Vital Signs (Current):   Vitals:    12/03/20 1202 12/07/20 1014 12/07/20 1023   BP:   (!) 146/91   Pulse:   76   Resp:   14   Temp:   97 °F (36.1 °C)   TempSrc:   Temporal   SpO2:   95%   Weight: 250 lb (113.4 kg) 249 lb 1.9 oz (113 kg)    Height: 5' 11\" (1.803 m)                                                BP Readings from Last 3 Encounters:   12/07/20 (!) 146/91   06/17/20 105/73   04/27/20 110/72       NPO Status: Time of last liquid consumption: 2100                        Time of last solid consumption: 2100                        Date of last liquid consumption: 12/06/20                        Date of last solid food consumption: 12/06/20    BMI:   Wt Readings from Last 3 Encounters:   12/07/20 249 lb 1.9 oz (113 kg)   06/17/20 256 lb 6.4 oz (116.3 kg)   04/27/20 257 lb (116.6 kg)     Body mass index is 34.75 kg/m². CBC:   Lab Results   Component Value Date    WBC 5.7 06/17/2020    RBC 4.92 06/17/2020    HGB 15.8 06/17/2020    HCT 46.1 06/17/2020    MCV 93.7 06/17/2020    RDW 13.2 06/17/2020     06/17/2020       CMP:   Lab Results   Component Value Date     06/17/2020    K 4.4 06/17/2020    K 4.5 04/12/2020     06/17/2020    CO2 25 06/17/2020    BUN 16 06/17/2020    CREATININE 0.8 06/17/2020    GFRAA >60 06/17/2020    AGRATIO 2.4 06/17/2020    LABGLOM >60 06/17/2020    GLUCOSE 90 06/17/2020    PROT 6.7 06/17/2020    CALCIUM 9.5 06/17/2020    BILITOT 0.5 06/17/2020    ALKPHOS 61 06/17/2020    AST 25 06/17/2020    ALT 28 06/17/2020       POC Tests: No results for input(s): POCGLU, POCNA, POCK, POCCL, POCBUN, POCHEMO, POCHCT in the last 72 hours.     Coags: No results found for: PROTIME, INR, APTT    HCG (If Applicable): No results found for: PREGTESTUR, PREGSERUM, HCG, HCGQUANT     ABGs: No results Dontae Wallace MD   12/7/2020

## 2020-12-07 NOTE — H&P
Pre-operative History and Physical    Patient: Hira Schmitz  : 1985  Acct#:     Intended Procedure:  Colonoscopy     HISTORY OF PRESENT ILLNESS:  The patient is a 37 y.o. male  who presents for/due to Surveillance/Hx Polyps     Past Medical History:    History reviewed. No pertinent past medical history. Past Surgical History:        Procedure Laterality Date    COLONOSCOPY  2017    Colonoscopy with polypectomy EMR (snare cautery/Elaview lift)    DENTAL SURGERY      2 dental implants in incisors    HAND TENDON SURGERY Left 2010    left index and thumb    LAPAROSCOPIC APPENDECTOMY  2002    LASIK  2016    SINUS ENDOSCOPY  2002     Medications Prior to Admission:   Prior to Admission medications    Not on File       Allergies:  Roche-2 inhibitors and Advil [ibuprofen]    Social History:   TOBACCO:   reports that he quit smoking about 14 years ago. His smoking use included cigarettes. He has a 5.00 pack-year smoking history. His smokeless tobacco use includes chew. ETOH:   reports current alcohol use of about 5.0 standard drinks of alcohol per week. DRUGS:   reports no history of drug use. PHYSICAL EXAM:      Vital Signs: Ht 5' 11\" (1.803 m)   Wt 250 lb (113.4 kg)   BMI 34.87 kg/m²    Airway: No stridor or wheezing noted. Good air movement  Pulmonary: without wheezes. Clear to auscultation  Cardiac:regular rate and rhythm without loud murmurs  Abdomen:soft, nontender,  Bowel sounds present    Pre-Procedure Assessment / Plan:  1) Colonoscopy     ASA Grade:  ASA 2 - Patient with mild systemic disease with no functional limitations  Mallampati Classification:  Class II    Level of Sedation Plan:Deep sedation    Post Procedure plan: Return to same level of care    I assessed the patient and find that the patient is in satisfactory condition to proceed with the planned procedure and sedation plan.     I have explained the risk, benefits, and alternatives to the procedure; the patient understands and agrees to proceed.        Elvis Cabrera MD  12/7/2020

## 2020-12-07 NOTE — ANESTHESIA POSTPROCEDURE EVALUATION
Department of Anesthesiology  Postprocedure Note    Patient: Ethel Qureshi  MRN: [id number]  [place]: 1985  Date of evaluation: 12/7/2020  Time:  11:47 AM     Procedure Summary     Date:  12/07/20 Room / Location:  [address] [place] [address]    Anesthesia Start:  1108 Anesthesia Stop:  1152    Procedure:  COLONOSCOPY (N/A ) Diagnosis:       Blood in stool      (BLOOD IN STOOL)    Surgeon:  Yana Carbajal MD Responsible Provider:  Armin Pike MD    Anesthesia Type:  MAC ASA Status:  2          Anesthesia Type: MAC    Bravo Phase I: Bravo Score: 9    Bravo Phase II: Bravo Score: 10    Last vitals: Reviewed and per EMR flowsheets.        Anesthesia Post Evaluation    Patient location during evaluation: PACU  Patient participation: complete - patient participated  Level of consciousness: awake and alert  Airway patency: patent  Nausea & Vomiting: no nausea and no vomiting  Complications: no  Cardiovascular status: hemodynamically stable  Respiratory status: acceptable  Hydration status: stable

## 2020-12-07 NOTE — PROGRESS NOTES
Patient received from PACU awake & alert. VSS, abd soft and non-tender. Snack provided. Call light within reach and use encouraged.

## 2021-07-19 ENCOUNTER — HOSPITAL ENCOUNTER (OUTPATIENT)
Age: 36
Discharge: HOME | End: 2021-07-19
Payer: COMMERCIAL

## 2021-07-19 ENCOUNTER — HOSPITAL ENCOUNTER (OUTPATIENT)
Dept: GENERAL RADIOLOGY | Age: 36
Discharge: HOME | End: 2021-07-19
Payer: COMMERCIAL

## 2021-07-19 ENCOUNTER — TELEPHONE (OUTPATIENT)
Dept: FAMILY MEDICINE CLINIC | Age: 36
End: 2021-07-19

## 2021-07-19 DIAGNOSIS — R05.9 COUGH: Primary | ICD-10-CM

## 2021-07-19 DIAGNOSIS — R05.9 COUGH: ICD-10-CM

## 2021-07-19 PROCEDURE — 71046 X-RAY EXAM CHEST 2 VIEWS: CPT

## 2021-07-19 NOTE — TELEPHONE ENCOUNTER
Patient called with a 3 week history of deep cough. Has had COVID vaccine. Deep dry cough, wheezing, wife is a nurse and said it sounds like he has fluid in lungs. Wondering if he can be sent for an x-ray today. Has had fever that run between .

## 2021-07-19 NOTE — TELEPHONE ENCOUNTER
X ray has been ordered. Please have the patient follow up with me with a virtual visit do discuss x ray and findings.

## 2021-07-20 ENCOUNTER — TELEPHONE (OUTPATIENT)
Dept: FAMILY MEDICINE CLINIC | Age: 36
End: 2021-07-20

## 2021-07-20 ENCOUNTER — OFFICE VISIT (OUTPATIENT)
Dept: FAMILY MEDICINE CLINIC | Age: 36
End: 2021-07-20
Payer: COMMERCIAL

## 2021-07-20 VITALS — BODY MASS INDEX: 34.86 KG/M2 | OXYGEN SATURATION: 96 % | WEIGHT: 249 LBS | HEIGHT: 71 IN

## 2021-07-20 DIAGNOSIS — J40 BRONCHITIS: Primary | ICD-10-CM

## 2021-07-20 PROCEDURE — 99213 OFFICE O/P EST LOW 20 MIN: CPT | Performed by: FAMILY MEDICINE

## 2021-07-20 RX ORDER — AZITHROMYCIN 250 MG/1
250 TABLET, FILM COATED ORAL SEE ADMIN INSTRUCTIONS
Qty: 6 TABLET | Refills: 0 | Status: SHIPPED | OUTPATIENT
Start: 2021-07-20 | End: 2021-07-25

## 2021-07-20 RX ORDER — METHYLPREDNISOLONE 4 MG/1
TABLET ORAL
Qty: 21 TABLET | Refills: 0 | Status: SHIPPED | OUTPATIENT
Start: 2021-07-20 | End: 2021-10-27 | Stop reason: ALTCHOICE

## 2021-07-20 SDOH — ECONOMIC STABILITY: FOOD INSECURITY: WITHIN THE PAST 12 MONTHS, THE FOOD YOU BOUGHT JUST DIDN'T LAST AND YOU DIDN'T HAVE MONEY TO GET MORE.: NEVER TRUE

## 2021-07-20 SDOH — ECONOMIC STABILITY: FOOD INSECURITY: WITHIN THE PAST 12 MONTHS, YOU WORRIED THAT YOUR FOOD WOULD RUN OUT BEFORE YOU GOT MONEY TO BUY MORE.: NEVER TRUE

## 2021-07-20 ASSESSMENT — ENCOUNTER SYMPTOMS
DIARRHEA: 0
SORE THROAT: 1
COUGH: 1
RHINORRHEA: 0
NAUSEA: 0
VOMITING: 0
ABDOMINAL PAIN: 0
SHORTNESS OF BREATH: 0

## 2021-07-20 ASSESSMENT — SOCIAL DETERMINANTS OF HEALTH (SDOH): HOW HARD IS IT FOR YOU TO PAY FOR THE VERY BASICS LIKE FOOD, HOUSING, MEDICAL CARE, AND HEATING?: NOT HARD AT ALL

## 2021-07-20 ASSESSMENT — PATIENT HEALTH QUESTIONNAIRE - PHQ9
SUM OF ALL RESPONSES TO PHQ QUESTIONS 1-9: 0
2. FEELING DOWN, DEPRESSED OR HOPELESS: 0
SUM OF ALL RESPONSES TO PHQ QUESTIONS 1-9: 0
SUM OF ALL RESPONSES TO PHQ QUESTIONS 1-9: 0
1. LITTLE INTEREST OR PLEASURE IN DOING THINGS: 0
SUM OF ALL RESPONSES TO PHQ9 QUESTIONS 1 & 2: 0

## 2021-07-20 NOTE — TELEPHONE ENCOUNTER
Patient called requesting an in office visit. Per last message from 7/19 and patient's current symptoms patient was to schedule VV.  Patient is requesting a call from  Dr Nan Cabrera MA

## 2021-07-20 NOTE — PROGRESS NOTES
2021    TELEHEALTH EVALUATION -- Audio/Visual (During IGPOV-18 public health emergency)    HPI:    Phil Hathaway (:  1985) has requested an audio/video evaluation for the following concern(s):    Patient presented with cough, wheezing, chest congestion and fever for going on three weeks. He stated that fever is around , daughter has similar symptoms and is on an antibiotic  Concerned about Pneumonia but chest x ray was negative. Denied ear pain, mild congestion, sore throat  Has had both COVID vaccines. Today, he denied chest pain, n, v, or diarrhea   Review of Systems   Constitutional: Negative for activity change, fatigue, fever and unexpected weight change. HENT: Positive for congestion and sore throat. Negative for ear pain and rhinorrhea. Respiratory: Positive for cough. Negative for shortness of breath. Cardiovascular: Negative for chest pain. Gastrointestinal: Negative for abdominal pain, diarrhea, nausea and vomiting. Neurological: Negative for headaches. Psychiatric/Behavioral: Negative for dysphoric mood. The patient is not nervous/anxious. Prior to Visit Medications    Medication Sig Taking? Authorizing Provider   azithromycin (ZITHROMAX) 250 MG tablet Take 1 tablet by mouth See Admin Instructions for 5 days 500mg on day 1 followed by 250mg on days 2 - 5 Yes Speedy Ambrose DO   methylPREDNISolone (MEDROL, KIM,) 4 MG tablet Take as directed. Yes Candida Lamar, DO       Social History     Tobacco Use    Smoking status: Former Smoker     Packs/day: 1.00     Years: 5.00     Pack years: 5.00     Types: Cigarettes     Quit date: 2006     Years since quittin.6    Smokeless tobacco: Current User     Types: Chew   Vaping Use    Vaping Use: Never used   Substance Use Topics    Alcohol use:  Yes     Alcohol/week: 5.0 standard drinks     Types: 5 Cans of beer per week     Comment: drinks socially    Drug use: Never        Allergies   Allergen Reactions    Roche-2 Inhibitors Rash     Rash, no hx hives or angioedema per patient and he could not recall this    Advil [Ibuprofen] Itching     HEAD ITCHES       PHYSICAL EXAMINATION:  [ INSTRUCTIONS:  \"[x]\" Indicates a positive item  \"[]\" Indicates a negative item  -- DELETE ALL ITEMS NOT EXAMINED]  Vital Signs: (As obtained by patient/caregiver or practitioner observation)  See Chart  Blood pressure-  Heart rate-    Respiratory rate-    Temperature-  Pulse oximetry-     Constitutional: [x] Appears well-developed and well-nourished [x] No apparent distress      [] Abnormal-   Mental status  [] Alert and awake  [] Oriented to person/place/time []Able to follow commands      Eyes:  EOM    [x]  Normal  [] Abnormal-  Sclera  []  Normal  [] Abnormal -         Discharge []  None visible  [] Abnormal -    HENT:   [x] Normocephalic, atraumatic. [] Abnormal   [] Mouth/Throat: Mucous membranes are moist.     External Ears [x] Normal  [] Abnormal-     Neck: [] No visualized mass     Pulmonary/Chest: [x] Respiratory effort normal.  [x] No visualized signs of difficulty breathing or respiratory distress        [x] Abnormal- cough     Musculoskeletal:   [] Normal gait with no signs of ataxia         [x] Normal range of motion of neck        [] Abnormal-       Neurological:        [x] No Facial Asymmetry (Cranial nerve 7 motor function) (limited exam to video visit)          [] No gaze palsy        [] Abnormal-         Skin:        [x] No significant exanthematous lesions or discoloration noted on facial skin         [] Abnormal-            Psychiatric:       [x] Normal Affect [] No Hallucinations        [] Abnormal-     Other pertinent observable physical exam findings-     ASSESSMENT/PLAN:  1. Bronchitis  Take medication as prescribed. Push fluids and rest  Discussed conservative treatment  Discussed signs and symptoms for immediate evaluation in the ER  RTC if no improved.    Tylenol/Ibuprofen for pain  Recommended COVID testing despite COVID vaccine  Flu and strep swabs  He will make appointment for this. - azithromycin (ZITHROMAX) 250 MG tablet; Take 1 tablet by mouth See Admin Instructions for 5 days 500mg on day 1 followed by 250mg on days 2 - 5  Dispense: 6 tablet; Refill: 0      Return for [place] up. Lyndon [place], was evaluated through a synchronous (real-time) audio-video encounter. The patient (or guardian if applicable) is aware that this is a billable service. Verbal consent to proceed has been obtained within the past 12 months. The visit was conducted pursuant to the emergency declaration under the [address] [place] [address], [address] [place] [address] authority and the [employer] General Act. Patient identification was verified, and a caregiver was present when appropriate. The patient was located in a state where the provider was credentialed to provide care. Total time spent on this encounter: Not billed by time    --Lucian Sosa DO on 7/20/2021 at 8:41 PM    An electronic signature was used to authenticate this note.

## 2021-09-21 ENCOUNTER — TELEPHONE (OUTPATIENT)
Dept: DERMATOLOGY | Age: 36
End: 2021-09-21

## 2021-09-21 NOTE — TELEPHONE ENCOUNTER
Called the [facility] program help line, and spoke to [facility]. I inquired on how to discontinue a patient in their program.She stated that if a patient is listed as inactive/permanently lost to follow up, it is the same as being listed as closed/discontinued. [facility] stated there is no further action required by our office.

## 2021-10-27 ENCOUNTER — OFFICE VISIT (OUTPATIENT)
Dept: FAMILY MEDICINE CLINIC | Age: 36
End: 2021-10-27
Payer: COMMERCIAL

## 2021-10-27 VITALS
SYSTOLIC BLOOD PRESSURE: 122 MMHG | DIASTOLIC BLOOD PRESSURE: 70 MMHG | BODY MASS INDEX: 37.77 KG/M2 | OXYGEN SATURATION: 97 % | WEIGHT: 270.8 LBS | HEART RATE: 72 BPM

## 2021-10-27 DIAGNOSIS — Z76.89 ENCOUNTER TO ESTABLISH CARE: ICD-10-CM

## 2021-10-27 DIAGNOSIS — Z00.00 EXAMINATION, MEDICAL, GENERAL: Primary | ICD-10-CM

## 2021-10-27 LAB
A/G RATIO: 1.7 (ref 1.1–2.2)
ALBUMIN SERPL-MCNC: 4.5 G/DL (ref 3.4–5)
ALP BLD-CCNC: 64 U/L (ref 40–129)
ALT SERPL-CCNC: 37 U/L (ref 10–40)
ANION GAP SERPL CALCULATED.3IONS-SCNC: 13 MMOL/L (ref 3–16)
AST SERPL-CCNC: 24 U/L (ref 15–37)
BILIRUB SERPL-MCNC: 0.5 MG/DL (ref 0–1)
BUN BLDV-MCNC: 17 MG/DL (ref 7–20)
CALCIUM SERPL-MCNC: 9.8 MG/DL (ref 8.3–10.6)
CHLORIDE BLD-SCNC: 105 MMOL/L (ref 99–110)
CHOLESTEROL, TOTAL: 229 MG/DL (ref 0–199)
CO2: 23 MMOL/L (ref 21–32)
CREAT SERPL-MCNC: 0.8 MG/DL (ref 0.9–1.3)
GFR AFRICAN AMERICAN: >60
GFR NON-AFRICAN AMERICAN: >60
GLOBULIN: 2.6 G/DL
GLUCOSE BLD-MCNC: 92 MG/DL (ref 70–99)
HCT VFR BLD CALC: 48.2 % (ref 40.5–52.5)
HDLC SERPL-MCNC: 43 MG/DL (ref 40–60)
HEMOGLOBIN: 16.4 G/DL (ref 13.5–17.5)
LDL CHOLESTEROL CALCULATED: 151 MG/DL
MCH RBC QN AUTO: 31.8 PG (ref 26–34)
MCHC RBC AUTO-ENTMCNC: 34.1 G/DL (ref 31–36)
MCV RBC AUTO: 93.2 FL (ref 80–100)
PDW BLD-RTO: 13 % (ref 12.4–15.4)
PLATELET # BLD: 206 K/UL (ref 135–450)
PMV BLD AUTO: 8.1 FL (ref 5–10.5)
POTASSIUM SERPL-SCNC: 4.7 MMOL/L (ref 3.5–5.1)
RBC # BLD: 5.17 M/UL (ref 4.2–5.9)
SODIUM BLD-SCNC: 141 MMOL/L (ref 136–145)
TOTAL PROTEIN: 7.1 G/DL (ref 6.4–8.2)
TRIGL SERPL-MCNC: 175 MG/DL (ref 0–150)
VLDLC SERPL CALC-MCNC: 35 MG/DL
WBC # BLD: 5.4 K/UL (ref 4–11)

## 2021-10-27 PROCEDURE — 36415 COLL VENOUS BLD VENIPUNCTURE: CPT | Performed by: FAMILY MEDICINE

## 2021-10-27 PROCEDURE — 99395 PREV VISIT EST AGE 18-39: CPT | Performed by: FAMILY MEDICINE

## 2021-10-27 ASSESSMENT — ENCOUNTER SYMPTOMS
ABDOMINAL PAIN: 0
SINUS PRESSURE: 0
SORE THROAT: 0
FACIAL SWELLING: 0
RHINORRHEA: 0
COUGH: 0
WHEEZING: 0
ANAL BLEEDING: 0
BLOOD IN STOOL: 0
SHORTNESS OF BREATH: 0
NAUSEA: 0
DIARRHEA: 0
CHEST TIGHTNESS: 0
TROUBLE SWALLOWING: 0
VOMITING: 0

## 2021-10-27 NOTE — PROGRESS NOTES
History     Tobacco Use    Smoking status: Former Smoker     Packs/day: 1.00     Years: 5.00     Pack years: 5.00     Types: Cigarettes     Quit date: 2006     Years since quittin.9    Smokeless tobacco: Current User     Types: Chew   Substance Use Topics    Alcohol use: Yes     Alcohol/week: 5.0 standard drinks     Types: 5 Cans of beer per week     Comment: drinks socially        Vitals:    10/27/21 0800   BP: 122/70   Pulse: 72   SpO2: 97%   Weight: 270 lb 12.8 oz (122.8 kg)     Estimated body mass index is 37.77 kg/m² as calculated from the following:    Height as of 21: 5' 11\" (1.803 m). Weight as of this encounter: 270 lb 12.8 oz (122.8 kg). Physical Exam  Vitals and nursing note reviewed. Constitutional:       Appearance: He is well-developed. HENT:      Head: Normocephalic and atraumatic. Right Ear: Tympanic membrane, ear canal and external ear normal.      Left Ear: Tympanic membrane, ear canal and external ear normal.   Eyes:      Pupils: Pupils are equal, round, and reactive to light. Neck:      Thyroid: No thyromegaly. Cardiovascular:      Rate and Rhythm: Normal rate and regular rhythm. Heart sounds: No murmur heard. Pulmonary:      Effort: Pulmonary effort is normal.      Breath sounds: Normal breath sounds. No wheezing or rales. Abdominal:      General: Bowel sounds are normal.      Palpations: Abdomen is soft. Tenderness: There is no abdominal tenderness. Musculoskeletal:         General: Normal range of motion. Skin:     Findings: No rash. Neurological:      Mental Status: He is alert and oriented to person, place, and time. Mental status is at baseline. Cranial Nerves: No cranial nerve deficit. Psychiatric:         Behavior: Behavior normal.         Judgment: Judgment normal.         ASSESSMENT/PLAN:  1.  Examination, medical, general  discussed vaccinations and he declined  -discussed HIV testing and basic labs he agreed  -discuseds healthy eating habits and exercise and answered questions  -discussed age appropriate screening recommendations  - detailed conversation concerning MARY and PSA testing, he declined. - CBC  - Comprehensive Metabolic Panel  - Lipid Panel    2. Encounter to establish care  Care established  Medications reviewed  Questions answered  Labs obtained  RTC in three months for follow up. - CBC  - Comprehensive Metabolic Panel  - Lipid Panel      Return in about 1 year (around 10/27/2022).

## 2022-02-22 ENCOUNTER — TELEPHONE (OUTPATIENT)
Dept: FAMILY MEDICINE CLINIC | Age: 37
End: 2022-02-22

## 2022-02-22 NOTE — TELEPHONE ENCOUNTER
Patient called stating he has had diarrhea since Saturday evening.  Patient denies any other symptoms

## 2022-02-22 NOTE — TELEPHONE ENCOUNTER
Have patient push fluids and use Imodium as directed. If still having problems please make follow up appointment.

## 2022-07-08 ENCOUNTER — E-VISIT (OUTPATIENT)
Dept: FAMILY MEDICINE CLINIC | Age: 37
End: 2022-07-08
Payer: COMMERCIAL

## 2022-07-08 DIAGNOSIS — B35.6 TINEA CRURIS: ICD-10-CM

## 2022-07-08 PROCEDURE — 99422 OL DIG E/M SVC 11-20 MIN: CPT | Performed by: FAMILY MEDICINE

## 2022-07-08 RX ORDER — PRENATAL VIT 91/IRON/FOLIC/DHA 28-975-200
COMBINATION PACKAGE (EA) ORAL
Qty: 42 G | Refills: 1 | Status: SHIPPED | OUTPATIENT
Start: 2022-07-08

## 2022-07-08 NOTE — PROGRESS NOTES
Berna Alexander (:  1985) is a 31 y.o. male,Established patient, here for evaluation of the following chief complaint(s):  No chief complaint on file. ASSESSMENT/PLAN:          Subjective   SUBJECTIVE/OBJECTIVE:  HPI  Yeast infection  Use medication as prescribed. Called into [facility]  Keep area dry  Discussed conservative treatment  Discussed signs and symptoms for immediate evaluation in the ER  RTC if no improved. Barrier cream is needed to moisturize     Review of Systems  Itchy skin  erythema    Objective    Erythematous skin    Physical Exam  No PE    On this date 2022 I have spent 11 minutes reviewing previous notes, test results and face to face with the patient discussing the diagnosis and importance of compliance with the treatment plan as well as documenting on the day of the visit. An electronic signature was used to authenticate this note.     --Joe Moore DO

## 2022-07-22 ENCOUNTER — OFFICE VISIT (OUTPATIENT)
Dept: FAMILY MEDICINE CLINIC | Age: 37
End: 2022-07-22
Payer: COMMERCIAL

## 2022-07-22 VITALS
DIASTOLIC BLOOD PRESSURE: 70 MMHG | BODY MASS INDEX: 35.57 KG/M2 | WEIGHT: 255 LBS | OXYGEN SATURATION: 97 % | HEART RATE: 101 BPM | SYSTOLIC BLOOD PRESSURE: 130 MMHG

## 2022-07-22 DIAGNOSIS — B35.6 TINEA CRURIS: Primary | ICD-10-CM

## 2022-07-22 PROCEDURE — 99213 OFFICE O/P EST LOW 20 MIN: CPT | Performed by: FAMILY MEDICINE

## 2022-07-22 RX ORDER — FLUCONAZOLE 150 MG/1
150 TABLET ORAL WEEKLY
Qty: 2 TABLET | Refills: 0 | Status: SHIPPED | OUTPATIENT
Start: 2022-07-22

## 2022-07-22 SDOH — ECONOMIC STABILITY: FOOD INSECURITY: WITHIN THE PAST 12 MONTHS, YOU WORRIED THAT YOUR FOOD WOULD RUN OUT BEFORE YOU GOT MONEY TO BUY MORE.: NEVER TRUE

## 2022-07-22 SDOH — ECONOMIC STABILITY: FOOD INSECURITY: WITHIN THE PAST 12 MONTHS, THE FOOD YOU BOUGHT JUST DIDN'T LAST AND YOU DIDN'T HAVE MONEY TO GET MORE.: NEVER TRUE

## 2022-07-22 ASSESSMENT — PATIENT HEALTH QUESTIONNAIRE - PHQ9
SUM OF ALL RESPONSES TO PHQ QUESTIONS 1-9: 0
SUM OF ALL RESPONSES TO PHQ QUESTIONS 1-9: 0
1. LITTLE INTEREST OR PLEASURE IN DOING THINGS: 0
SUM OF ALL RESPONSES TO PHQ9 QUESTIONS 1 & 2: 0
2. FEELING DOWN, DEPRESSED OR HOPELESS: 0
SUM OF ALL RESPONSES TO PHQ QUESTIONS 1-9: 0
SUM OF ALL RESPONSES TO PHQ QUESTIONS 1-9: 0

## 2022-07-22 ASSESSMENT — SOCIAL DETERMINANTS OF HEALTH (SDOH): HOW HARD IS IT FOR YOU TO PAY FOR THE VERY BASICS LIKE FOOD, HOUSING, MEDICAL CARE, AND HEATING?: NOT HARD AT ALL

## 2022-07-22 NOTE — PROGRESS NOTES
2022     Joaquin Rhodes (:  1985) is a 31 y.o. male, here for evaluation of the following medical concerns:    HPI  Patient presented for tinea in groin area, foot, and buttocks. Several weeks. Has been using Nystatin with minimal improvement  Erythema has improved  Still itching  Requesting PO medication. No allergies to medications    Review of Systems   Constitutional:  Negative for activity change, fatigue and fever. Respiratory:  Negative for shortness of breath. Cardiovascular:  Negative for chest pain. Gastrointestinal:  Negative for abdominal pain, diarrhea, nausea and vomiting. Musculoskeletal:  Negative for arthralgias. Skin:  Positive for rash. Prior to Visit Medications    Medication Sig Taking? Authorizing Provider   fluconazole (DIFLUCAN) 150 MG tablet Take 1 tablet by mouth once a week Yes Speedy Ambrose DO   terbinafine (LAMISIL AT) 1 % cream Apply topically 2 times daily. Yes Gracia Valencia, DO        Social History     Tobacco Use    Smoking status: Former     Packs/day: 1.00     Years: 5.00     Pack years: 5.00     Types: Cigarettes     Quit date: 2006     Years since quitting: 15.6    Smokeless tobacco: Current     Types: Chew   Substance Use Topics    Alcohol use: Yes     Alcohol/week: 5.0 standard drinks     Types: 5 Cans of beer per week     Comment: drinks socially        Vitals:    22 1327   BP: 130/70   Pulse: (!) 101   SpO2: 97%   Weight: 255 lb (115.7 kg)     Estimated body mass index is 35.57 kg/m² as calculated from the following:    Height as of 21: 5' 11\" (1.803 m). Weight as of this encounter: 255 lb (115.7 kg). Physical Exam  Vitals and nursing note reviewed. Constitutional:       Appearance: He is well-developed. HENT:      Head: Normocephalic and atraumatic. Right Ear: External ear normal.      Left Ear: External ear normal.   Neck:      Thyroid: No thyromegaly.    Cardiovascular:      Rate and Rhythm: Normal rate and regular rhythm. Heart sounds: No murmur heard. Pulmonary:      Effort: Pulmonary effort is normal.      Breath sounds: Normal breath sounds. No wheezing or rales. Skin:     Findings: Rash present. Neurological:      Mental Status: He is alert and oriented to person, place, and time. Psychiatric:         Behavior: Behavior normal.       ASSESSMENT/PLAN:  Tinea  Take medication as prescribed. Push fluids and rest  Discussed conservative treatment  Stable today  RTC if no improved. Tylenol/Ibuprofen for pain     No follow-ups on file.

## 2022-07-23 ASSESSMENT — ENCOUNTER SYMPTOMS
DIARRHEA: 0
NAUSEA: 0
ABDOMINAL PAIN: 0
SHORTNESS OF BREATH: 0
VOMITING: 0

## 2022-08-06 RX ORDER — FLUCONAZOLE 150 MG/1
TABLET ORAL
Qty: 2 TABLET | Refills: 0 | OUTPATIENT
Start: 2022-08-06

## 2022-09-23 ENCOUNTER — E-VISIT (OUTPATIENT)
Dept: FAMILY MEDICINE CLINIC | Age: 37
End: 2022-09-23
Payer: COMMERCIAL

## 2022-09-23 PROCEDURE — 99422 OL DIG E/M SVC 11-20 MIN: CPT | Performed by: FAMILY MEDICINE

## 2022-09-23 RX ORDER — AMOXICILLIN 875 MG/1
875 TABLET, COATED ORAL 2 TIMES DAILY
Qty: 20 TABLET | Refills: 0 | Status: SHIPPED | OUTPATIENT
Start: 2022-09-23 | End: 2022-10-03

## 2022-09-23 ASSESSMENT — LIFESTYLE VARIABLES: SMOKING_STATUS: NO, I'VE NEVER SMOKED

## 2022-09-23 NOTE — PROGRESS NOTES
Kristin Vega (:  1985) is a 30 y.o. male,Established patient, here for evaluation of the following chief complaint(s):  No chief complaint on file. ASSESSMENT/PLAN:  SINUSITIS  Antibiotic provided for 10 days  Push fluids and rest  Use Tylenol/Ibuprofen for pain  Please rtc if not improved. No follow-ups on file. If not improved      Subjective   SUBJECTIVE/OBJECTIVE:  HPI  2-3 weeks  Nasal congestion  Rhinorrhea   Family sickness  Review of Systems  See note    Objective   Physical Exam  No PE    On this date 2022 I have spent 11 minutes reviewing previous notes, test results and face to face with the patient discussing the diagnosis and importance of compliance with the treatment plan as well as documenting on the day of the visit. An electronic signature was used to authenticate this note.     --Isaura Frances DO

## 2022-12-07 ENCOUNTER — HOSPITAL ENCOUNTER (EMERGENCY)
Age: 37
Discharge: HOME | End: 2022-12-07
Attending: EMERGENCY MEDICINE
Payer: COMMERCIAL

## 2022-12-07 ENCOUNTER — NURSE TRIAGE (OUTPATIENT)
Dept: OTHER | Facility: CLINIC | Age: 37
End: 2022-12-07

## 2022-12-07 ENCOUNTER — TELEPHONE (OUTPATIENT)
Dept: FAMILY MEDICINE CLINIC | Age: 37
End: 2022-12-07

## 2022-12-07 VITALS
DIASTOLIC BLOOD PRESSURE: 92 MMHG | WEIGHT: 261.25 LBS | RESPIRATION RATE: 16 BRPM | HEIGHT: 71 IN | BODY MASS INDEX: 36.57 KG/M2 | OXYGEN SATURATION: 96 % | SYSTOLIC BLOOD PRESSURE: 134 MMHG | HEART RATE: 77 BPM | TEMPERATURE: 97.7 F

## 2022-12-07 DIAGNOSIS — T15.91XA FOREIGN BODY OF RIGHT EXTERNAL EYE, INITIAL ENCOUNTER: Primary | ICD-10-CM

## 2022-12-07 DIAGNOSIS — S05.01XA ABRASION OF RIGHT CORNEA, INITIAL ENCOUNTER: ICD-10-CM

## 2022-12-07 PROCEDURE — 65220 REMOVE FOREIGN BODY FROM EYE: CPT

## 2022-12-07 PROCEDURE — 6370000000 HC RX 637 (ALT 250 FOR IP)

## 2022-12-07 PROCEDURE — 99283 EMERGENCY DEPT VISIT LOW MDM: CPT

## 2022-12-07 PROCEDURE — 2500000003 HC RX 250 WO HCPCS

## 2022-12-07 RX ORDER — ERYTHROMYCIN 5 MG/G
OINTMENT OPHTHALMIC ONCE
Status: COMPLETED | OUTPATIENT
Start: 2022-12-07 | End: 2022-12-07

## 2022-12-07 RX ORDER — ERYTHROMYCIN 5 MG/G
1 OINTMENT OPHTHALMIC 4 TIMES DAILY
Qty: 1 EACH | Refills: 0 | Status: SHIPPED | OUTPATIENT
Start: 2022-12-07 | End: 2022-12-12

## 2022-12-07 RX ORDER — PROPARACAINE HYDROCHLORIDE 5 MG/ML
1 SOLUTION/ DROPS OPHTHALMIC ONCE
Status: COMPLETED | OUTPATIENT
Start: 2022-12-07 | End: 2022-12-07

## 2022-12-07 RX ADMIN — PROPARACAINE HYDROCHLORIDE 1 DROP: 5 SOLUTION/ DROPS OPHTHALMIC at 19:29

## 2022-12-07 RX ADMIN — FLUORESCEIN SODIUM 1 MG: 1 STRIP OPHTHALMIC at 19:29

## 2022-12-07 RX ADMIN — ERYTHROMYCIN: 5 OINTMENT OPHTHALMIC at 20:37

## 2022-12-07 ASSESSMENT — ENCOUNTER SYMPTOMS
ABDOMINAL PAIN: 0
SORE THROAT: 0
EYE PAIN: 1
PHOTOPHOBIA: 1
DIARRHEA: 0
EYE REDNESS: 1
NAUSEA: 0
BACK PAIN: 0
RHINORRHEA: 0
COUGH: 0
VOMITING: 0
CONSTIPATION: 0
SHORTNESS OF BREATH: 0

## 2022-12-07 ASSESSMENT — VISUAL ACUITY: OU: 1

## 2022-12-07 ASSESSMENT — PAIN - FUNCTIONAL ASSESSMENT: PAIN_FUNCTIONAL_ASSESSMENT: NONE - DENIES PAIN

## 2022-12-07 NOTE — TELEPHONE ENCOUNTER
Patient could have a corneal abrasion. I'm not in the office the rest at this time and would recommend him having this evaluated at a [facility]. Also, if possible works comp would have him reach out to employer for direction.

## 2022-12-07 NOTE — TELEPHONE ENCOUNTER
Location of patient: [place]    Subjective: Caller states \"Got something in his eye while working at home. Potentially a piece of concrete in R eye. Pt was using a concrete  when piece came off and hit him in the eye. \"     Current Symptoms: Blurry vision,     Onset: 5 days ago;         Pain Severity: 0/10; Temperature: denies     What has been tried: flushed eye, antibiotic eye drops. Recommended disposition: Go to ED Now    Care advice provided, patient verbalizes understanding; denies any other questions or concerns; instructed to call back for any new or worsening symptoms. Patient/caller agrees to proceed to [facility] Emergency [facility]    Attention Provider: Thank you for allowing me to participate in the care of your patient. The patient was connected to triage in response to symptoms provided. Please do not respond through this encounter as the response is not directed to a shared pool.       Reason for Disposition   [1] Sharp FB (even if FB was removed) AND [2] any pain present now    Protocols used: Eye - Foreign Body-ADULT-

## 2022-12-07 NOTE — TELEPHONE ENCOUNTER
Patient called stating he thought he got something in his eye Friday at work and it is not any better. States his eye feels scratchy and is red. Patient denies swelling, itching, pain and drainage. Patient states wife was diagnosed with pink eye yesterday by Dr Serge Enamorado.  Patient states he is positive he got \"something\" in his eye while at work on Friday

## 2022-12-08 NOTE — DISCHARGE INSTRUCTIONS
Please take all medications as prescribed. Please continue with Tylenol as needed for pain. Please follow-up with the eye specialist.  Call them first thing in the morning and schedule an appointment for the next 2 to 3 days.

## 2022-12-08 NOTE — ED PROVIDER NOTES
Attending Note:    The patient was seen and examined by the mid-level provider. I also completed a face-to-face examination. HPI: General Vicente is a 30 y.o. male who presents to the emergency department with a complaint of a corneal foreign body sensation since Friday afternoon 5 days ago. The patient was using a  to grind some concrete when something went in his eye. He does not wear glasses or contact lenses. He denies any headache, fever or chills. He does report tearing from the eye but no purulent drainage. He denies any vision loss or headache. No rash. Physical Exam:     Constitutional: No apparent distress. Head: No visible evidence of trauma. Normocephalic. Eyes: Pupils equal and reactive. Peoples are 3 mm and reactive bilaterally. Anterior chambers are clear. Extraocular muscles are intact. No pain with extraocular movement. No periorbital edema. No rash. Lash margins are clear. No discharge. There is a small pinpoint corneal foreign body as noted below in the procedure exam.  No evidence of conjunctivitis. No photophobia. Conjunctiva normal.    Heart:  Regular rate and rhythm. Lungs: No conversational dyspnea or accessory muscle use. Musculoskeletal: Extremities non-tender with full range of motion. Neurological: Alert and oriented x 3. Speech clear. No acute focal motor or sensory deficits. Skin: Skin is warm and dry. No rash. Psychiatric: Normal mood and affect.  Behavior is normal.     DIAGNOSTIC RESULTS     EKG: All EKG's are interpreted by the Emergency Department Physician who either signs or Co-signs this chart in the absence of a cardiologist.        RADIOLOGY:   Non-plain film images such as CT, Ultrasound and MRI are read by the radiologist. Plain radiographic images are visualized and preliminarily interpreted by the emergency physician with the below findings:        Interpretation per the Radiologist below, if available at the time of this note:    No orders to display         ED BEDSIDE ULTRASOUND:   Performed by ED Physician - none    LABS:  Labs Reviewed - No data to display    All other labs were within normal range or not returned as of this dictation. EMERGENCY DEPARTMENT COURSE and DIFFERENTIAL DIAGNOSIS/MDM:   Vitals:    Vitals:    12/07/22 1904 12/07/22 2044   BP: (!) 132/94 (!) 134/92   Pulse: 82 77   Resp: 16    Temp: 97.7 °F (36.5 °C)    TempSrc: Tympanic    SpO2: 96%    Weight: 261 lb 3.9 oz (118.5 kg)    Height: 5' 11\" (1.803 m)            MDM        I personally saw and performed a substantive portion of the visit including all aspects of the medical decision making. Patient presents with corneal foreign body as noted above. Foreign body was removed as noted below. I cannot exclude the possibility of microscopic retained debris. Slit-lamp is unavailable for further exam.  The patient will be referred to [facility] and was advised to call tomorrow for an appointment to be seen in 1 to 2 days. He will be placed on Polysporin ophthalmic ointment. I advised him to not rub or scratch the eye and drink plenty of fluids. Avoid bright light. Do not drive or operate machinery while using eye ointment. May interfere with vision. If condition worsens or new symptoms develop, he was advised to return immediately to the emergency department. CRITICAL CARE TIME     I personally saw the patient and independently provided 0 minutes of non-concurrent critical care out of the total shared critical care time provided. This excludes separately reportable procedures. There was a high probability of clinically significant/life threatening deterioration in the patient's condition which required my urgent intervention.       CONSULTS:  None    PROCEDURES:  Unless otherwise noted below, none     Procedures    Right corneal foreign body removal:    Prior to my examination the patient had been given 2 drops of tetracaine ophthalmic solution 0.5% and 2 drops of fluorescein stain. In addition, an attempt of removal with the ophthalmic bur had been attempted by the nurse practitioner. The patient has a small pinpoint black metallic appearing foreign body in the central portion of the cornea in the 3 o'clock position just to the left of the midline. No corneal haziness. This appears to be very superficial.  Lids were everted. No evidence of foreign body. Using a 25-gauge needle, the needle was brought in parallel to the surface of the cornea and double was rotated to lift the foreign body off of the surface of the cornea. Patient tolerated this well. He was examined with the Pinola Milder lamp following the procedure and there was no visible retained foreign body. However, I cannot exclude the possibility of microscopic debris. Slit-lamp was not available as the bulb on the slit-lamp was nonfunctional.    FINAL IMPRESSION      1. Foreign body of right external eye, initial encounter    2. Abrasion of right cornea, initial encounter          DISPOSITION/PLAN   DISPOSITION Decision To Discharge 12/07/2022 08:13:54 PM      PATIENT REFERRED TO:  [facility]  [address] [place] [address],[address] [place] [address]  [phone]  Call in 1 day  Follow up on your recent ED visit    DISCHARGE MEDICATIONS:  Discharge Medication List as of 12/7/2022  8:39 PM        START taking these medications    Details   erythromycin (ROMYCIN) 5 MG/GM ophthalmic ointment Place 1 cm into the right eye 4 times daily for 5 days, Right Eye, 4 TIMES DAILY Starting Wed 12/7/2022, Until Mon 12/12/2022, For 5 days, Disp-1 each, R-0, Print           Controlled Substances Monitoring:     RX Monitoring 4/12/2020   Periodic Controlled Substance Monitoring Possible medication side effects, risk of tolerance/dependence & alternative treatments discussed.        (Please note that portions of this note were completed with a voice recognition program. Efforts were made to edit the dictations but occasionally words are mis-transcribed. )    Charito Andres DO (electronically signed)  Attending Emergency Physician       Milton Bosch DO  12/08/22 [id number]

## 2022-12-08 NOTE — ED PROVIDER NOTES
[address] [facility]        Pt Name: Ever Díaz  MRN: [id number]  [place] 1985  Date of evaluation: 12/7/2022  Provider: MERT Pratt - [place]  PCP: Dariana Perry DO  Note Started: 7:01 PM EST12/7/2022        I have seen and evaluated this patient with my supervising physician Dr Cabrera Power. Triage CHIEF COMPLAINT       Chief Complaint   Patient presents with    Eye Injury     Pt states that he scratched cornea on Friday. Was using  and concrete went into eye. HISTORY OF PRESENT ILLNESS   (Location/Symptom, Timing/Onset, Context/Setting, Quality, Duration, Modifying Factors, Severity)  Note limiting factors. Chief Complaint: Above    Ever Díaz is a 30 y.o. male who presents ED with concern for FB in right eye. Noticed when grinding concretelast Friday. TDAP utd  Tried home gent w/o relief. (leftover prescription)  Now having photophobia, blurry vision occasionally. Nursing Notes were all reviewed and agreed with or any disagreements were addressed in the HPI. REVIEW OF SYSTEMS    (2-9 systems for level 4, 10 or more for level 5)     Review of Systems   Constitutional:  Negative for chills, diaphoresis and fever. HENT:  Negative for congestion, rhinorrhea and sore throat. Eyes:  Positive for photophobia, pain, redness and visual disturbance. Respiratory:  Negative for cough and shortness of breath. Cardiovascular:  Negative for chest pain and leg swelling. Gastrointestinal:  Negative for abdominal pain, constipation, diarrhea, nausea and vomiting. Genitourinary:  Negative for frequency and hematuria. Musculoskeletal:  Negative for back pain and neck pain. Skin:  Negative for rash and wound. Neurological:  Negative for dizziness and light-headedness. PAST MEDICAL HISTORY   History reviewed. No pertinent past medical history.     SURGICAL HISTORY     Past Surgical History: Procedure Laterality Date    COLONOSCOPY  2020    Dr. Onur Viramontes    COLONOSCOPY N/A 2020    COLONOSCOPY performed by Misael Parham MD at [facility]      2 dental implants in incisors    HAND TENDON SURGERY Left 2010    left index and thumb    LAPAROSCOPIC APPENDECTOMY      LASIK      SINUS ENDOSCOPY         CURRENTMEDICATIONS       Discharge Medication List as of 2022  8:39 PM        CONTINUE these medications which have NOT CHANGED    Details   fluconazole (DIFLUCAN) 150 MG tablet Take 1 tablet by mouth once a week, Disp-2 tablet, R-0Normal      terbinafine (LAMISIL AT) 1 % cream Apply topically 2 times daily. , Disp-42 g, R-1, Normal             ALLERGIES     Roche-2 inhibitors and Advil [ibuprofen]    FAMILYHISTORY       Family History   Problem Relation Age of Onset    No Known Problems Mother     Colon Cancer Maternal Grandfather     Heart Disease Maternal Uncle     Cancer Maternal Grandmother         SOCIAL HISTORY       Social History     Socioeconomic History    Marital status:      Spouse name: Sonali Heath     Number of children: 1    Years of education: None    Highest education level: Bachelor's degree (e.g., BA, AB, BS)   Occupational History    Occupation: Construction   Tobacco Use    Smoking status: Former     Packs/day: 1.00     Years: 5.00     Pack years: 5.00     Types: Cigarettes     Quit date: 2006     Years since quittin.0    Smokeless tobacco: Current     Types: Chew   Vaping Use    Vaping Use: Never used   Substance and Sexual Activity    Alcohol use:  Yes     Alcohol/week: 5.0 standard drinks     Types: 5 Cans of beer per week     Comment: drinks socially    Drug use: Never    Sexual activity: Yes     Partners: Female     Social Determinants of Health     Financial Resource Strain: Low Risk     Difficulty of Paying Living Expenses: Not hard at all   Food Insecurity: No Food Insecurity    Worried About Running Out of tenderness or guarding. Musculoskeletal:         General: Normal range of motion. Cervical back: Normal range of motion and neck supple. No rigidity. Lymphadenopathy:      Cervical: No cervical adenopathy. Skin:     General: Skin is warm and dry. Capillary Refill: Capillary refill takes less than 2 seconds. Coloration: Skin is not jaundiced or pale. Findings: No bruising or rash. Neurological:      General: No focal deficit present. Mental Status: He is alert and oriented to person, place, and time. Mental status is at baseline. Psychiatric:         Mood and Affect: Mood normal.         Behavior: Behavior normal.       DIAGNOSTIC RESULTS   LABS:    Labs Reviewed - No data to display    When ordered, only abnormal lab results are displayed. All other labs were within normal range or not returned as of this dictation. EKG: When ordered, EKG's are interpreted by the Emergency Department Physician in the absence of a cardiologist.  Please see their note for interpretation of EKG. RADIOLOGY:   Non-plain film images such as CT, Ultrasound and MRI are read by the radiologist. Plain radiographic images are visualized and preliminarily interpreted by the  ED Provider with the below findings:        Interpretation [facility][note 3] Radiologist below, if available at the time of this note:    No orders to display     No results found.       PROCEDURES   Unless otherwise noted below, none     Procedures    CRITICAL CARE TIME   N/A    CONSULTS:  None      EMERGENCY DEPARTMENT COURSE and DIFFERENTIAL DIAGNOSIS/MDM:   Vitals:    Vitals:    12/07/22 1904 12/07/22 2044   BP: (!) 132/94 (!) 134/92   Pulse: 82 77   Resp: 16    Temp: 97.7 °F (36.5 °C)    TempSrc: Tympanic    SpO2: 96%    Weight: 261 lb 3.9 oz (118.5 kg)    Height: 5' 11\" (1.803 m)        Patient was given the following medications:  Medications   fluorescein ophthalmic strip 1 mg (1 mg Both Eyes Given 12/7/22 1929)   proparacaine (ALCAINE) 0.5 % ophthalmic solution 1 drop (1 drop Ophthalmic Given 12/7/22 1929)   erythromycin [facility]) ophthalmic ointment ( Right Eye Given 12/7/22 2037)         Is this patient to be included in the SEP-1 Core Measure due to severe sepsis or septic shock? No   Exclusion criteria - the patient is NOT to be included for SEP-1 Core Measure due to:  2+ SIRS criteria are not met    79-year-old presenting to ED with concern for foreign body in the right eye. Visual acuity slightly diminished on right side however still better than 20/20. Vitals stable. No obvious infection. No pain with extraocular movements. No obvious infection. Patient was numbed. Attempted removal of foreign body unsuccessfully. Please see removal note by my attending, Dr. Ashwin Gomes. He was successfully moving the foreign body. Appears to be a metal shaving. Not concrete. Patient to be discharged home with erythromycin as well as close follow-up with [facility] Airstides Carrero    Tetanus status and pain management addressed. Patient verbalized comprehension of the plan for ophthalmic antibiotics and follow with the ophthalmologist tomorrow. The patient is at low risk for mortality based on demographic, history and clinical factors. Given the best available information and clinical assessment, I estimate the risk of hospitalization to be greater than risk of treatment at home. I have explained to the patient that the risk could rapidly change, given precautions for return and instructions. Explained to patient that the risk for mortality is low based on demographic, history and clinical factors. I discussed with patient the results of evaluation in the ED, diagnosis, care, and prognosis. The plan is to discharge to home. Patient is in agreement with plan and questions have been answered. I also discussed with patient the reasons which may require a return visit and the importance of follow-up care.   The patient is well-appearing, nontoxic, and improved at the time of discharge. Patient agrees to call to arrange follow-up care as directed. Patient understands to return immediately for worsening/change in symptoms. I am the Primary Clinician of Record. FINAL IMPRESSION      1. Foreign body of right external eye, initial encounter    2.  Abrasion of right cornea, initial encounter          DISPOSITION/PLAN   DISPOSITION Decision To Discharge 12/07/2022 08:13:54 PM      PATIENT [facility]TO:  [facility] [address]  [address] [place] [address] [phone]  Call in 1 day  Follow up on your recent ED visit    DISCHARGE MEDICATIONS:  Discharge Medication List as of 12/7/2022  8:39 PM        START taking these medications    Details   erythromycin (ROMYCIN) 5 MG/GM ophthalmic ointment Place 1 cm into the right eye 4 times daily for 5 days, Right Eye, 4 TIMES DAILY Starting Wed 12/7/2022, Until Mon 12/12/2022, For 5 days, Disp-1 each, R-0, Print             DISCONTINUED MEDICATIONS:  Discharge Medication List as of 12/7/2022  8:39 PM                 (Please note that portions ofthis note were completed with a voice recognition program.  Efforts were made to edit the dictations but occasionally words are mis-transcribed.)    MERT Sue CNP (electronically signed)              MERT Sue CNP  12/07/22 [id number]

## 2023-08-28 ENCOUNTER — E-VISIT (OUTPATIENT)
Dept: FAMILY MEDICINE CLINIC | Age: 38
End: 2023-08-28
Payer: COMMERCIAL

## 2023-08-28 DIAGNOSIS — J40 BRONCHITIS: Primary | ICD-10-CM

## 2023-08-28 PROCEDURE — 99422 OL DIG E/M SVC 11-20 MIN: CPT | Performed by: FAMILY MEDICINE

## 2023-08-28 ASSESSMENT — LIFESTYLE VARIABLES
SMOKING_STATUS: NO, I'M A FORMER SMOKER
PACKS_PER_DAY: 1
SMOKING_YEARS: 4

## 2023-08-29 RX ORDER — AZITHROMYCIN 250 MG/1
250 TABLET, FILM COATED ORAL SEE ADMIN INSTRUCTIONS
Qty: 6 TABLET | Refills: 0 | Status: SHIPPED | OUTPATIENT
Start: 2023-08-29 | End: 2023-09-03

## 2023-08-29 NOTE — PROGRESS NOTES
Jackie Velazco (:  1985) is a 41 y.o. male,Established patient, here for evaluation of the following chief complaint(s):  No chief complaint on file. ASSESSMENT/PLAN:  Bronchitis  Antibiotic provided for 10 days  Push fluids and rest  Use Tylenol/Ibuprofen for pain  Please rtc if not improved. No follow-ups on file. Follow up if not improved      Subjective   SUBJECTIVE/OBJECTIVE:  HPI    Patient has had coughing  Mucus production  Hasn't improved with otc medication  10 days. Review of Systems  See HPI    Objective   Physical Exam  No PE    On this date 2023 I have spent 11 minutes reviewing previous notes, test results and face to face with the patient discussing the diagnosis and importance of compliance with the treatment plan as well as documenting on the day of the visit. An electronic signature was used to authenticate this note.     --Christopher Layne, DO

## 2024-01-31 ENCOUNTER — OFFICE VISIT (OUTPATIENT)
Dept: FAMILY MEDICINE CLINIC | Age: 39
End: 2024-01-31
Payer: COMMERCIAL

## 2024-01-31 VITALS
HEIGHT: 71 IN | SYSTOLIC BLOOD PRESSURE: 124 MMHG | DIASTOLIC BLOOD PRESSURE: 82 MMHG | BODY MASS INDEX: 36.54 KG/M2 | WEIGHT: 261 LBS

## 2024-01-31 DIAGNOSIS — Z00.00 ENCOUNTER FOR WELL ADULT EXAM WITHOUT ABNORMAL FINDINGS: ICD-10-CM

## 2024-01-31 DIAGNOSIS — Z00.00 EXAMINATION, MEDICAL, GENERAL: Primary | ICD-10-CM

## 2024-01-31 PROCEDURE — 90471 IMMUNIZATION ADMIN: CPT | Performed by: FAMILY MEDICINE

## 2024-01-31 PROCEDURE — 99395 PREV VISIT EST AGE 18-39: CPT | Performed by: FAMILY MEDICINE

## 2024-01-31 PROCEDURE — 90674 CCIIV4 VAC NO PRSV 0.5 ML IM: CPT | Performed by: FAMILY MEDICINE

## 2024-01-31 ASSESSMENT — ENCOUNTER SYMPTOMS
ANAL BLEEDING: 0
SHORTNESS OF BREATH: 0
NAUSEA: 0
ABDOMINAL PAIN: 0
WHEEZING: 0
FACIAL SWELLING: 0
TROUBLE SWALLOWING: 0
VOMITING: 0
BLOOD IN STOOL: 0
CHEST TIGHTNESS: 0
DIARRHEA: 0
SORE THROAT: 0
SINUS PRESSURE: 0
RHINORRHEA: 0
COUGH: 0
EYE DISCHARGE: 0

## 2024-01-31 ASSESSMENT — PATIENT HEALTH QUESTIONNAIRE - PHQ9
2. FEELING DOWN, DEPRESSED OR HOPELESS: 0
SUM OF ALL RESPONSES TO PHQ QUESTIONS 1-9: 0
1. LITTLE INTEREST OR PLEASURE IN DOING THINGS: 0
SUM OF ALL RESPONSES TO PHQ QUESTIONS 1-9: 0
SUM OF ALL RESPONSES TO PHQ9 QUESTIONS 1 & 2: 0

## 2024-01-31 NOTE — PROGRESS NOTES
Well Adult Note  Name: Nacho William Today’s Date: 2/3/2024   MRN: [id number] Sex: Male   Age: 38 y.o. Ethnicity: Non- / Non    : 1985 Race: White (non-)      Nacho William is here for well adult exam.  History:      Review of Systems   Constitutional:  Negative for activity change, fatigue, fever and unexpected weight change.   HENT:  Negative for congestion, ear pain, facial swelling, hearing loss, rhinorrhea, sinus pressure, sore throat and trouble swallowing.    Eyes:  Negative for discharge and visual disturbance.   Respiratory:  Negative for cough, chest tightness, shortness of breath and wheezing.    Cardiovascular:  Negative for chest pain, palpitations and leg swelling.   Gastrointestinal:  Negative for abdominal pain, anal bleeding, blood in stool, diarrhea, nausea and vomiting.   Endocrine: Negative for cold intolerance, heat intolerance, polydipsia and polyphagia.   Genitourinary:  Negative for decreased urine volume, dysuria, frequency, genital sores, penile discharge, penile pain, testicular pain and urgency.   Musculoskeletal:  Negative for arthralgias.   Skin:  Negative for rash.   Allergic/Immunologic: Negative for food allergies.   Neurological:  Negative for dizziness, syncope, light-headedness and headaches.   Hematological:  Does not bruise/bleed easily.   Psychiatric/Behavioral:  Negative for suicidal ideas. The patient is not nervous/anxious.        Allergies   Allergen Reactions    Roche-2 Inhibitors Rash     Rash, no hx hives or angioedema per patient and he could not recall this    Advil [Ibuprofen] Itching     HEAD ITCHES         Prior to Visit Medications    Not on File       History reviewed. No pertinent past medical history.    Past Surgical History:   Procedure Laterality Date    COLONOSCOPY  2020    Dr. Danial Tran    COLONOSCOPY N/A 2020    COLONOSCOPY performed by Danial Tran MD at [facility] ENDOSCOPY    DENTAL SURGERY  2007    2 dental

## 2024-02-02 DIAGNOSIS — Z00.00 EXAMINATION, MEDICAL, GENERAL: ICD-10-CM

## 2024-02-02 LAB
ALBUMIN SERPL-MCNC: 4.5 G/DL (ref 3.4–5)
ALBUMIN/GLOB SERPL: 2 {RATIO} (ref 1.1–2.2)
ALP SERPL-CCNC: 56 U/L (ref 40–129)
ALT SERPL-CCNC: 44 U/L (ref 10–40)
ANION GAP SERPL CALCULATED.3IONS-SCNC: 10 MMOL/L (ref 3–16)
AST SERPL-CCNC: 25 U/L (ref 15–37)
BASOPHILS # BLD: 0 K/UL (ref 0–0.2)
BASOPHILS NFR BLD: 0.4 %
BILIRUB SERPL-MCNC: 0.7 MG/DL (ref 0–1)
BUN SERPL-MCNC: 16 MG/DL (ref 7–20)
CALCIUM SERPL-MCNC: 8.9 MG/DL (ref 8.3–10.6)
CHLORIDE SERPL-SCNC: 103 MMOL/L (ref 99–110)
CHOLEST SERPL-MCNC: 224 MG/DL (ref 0–199)
CO2 SERPL-SCNC: 27 MMOL/L (ref 21–32)
CREAT SERPL-MCNC: 0.8 MG/DL (ref 0.9–1.3)
DEPRECATED RDW RBC AUTO: 12.5 % (ref 12.4–15.4)
EOSINOPHIL # BLD: 0.1 K/UL (ref 0–0.6)
EOSINOPHIL NFR BLD: 2.1 %
GFR SERPLBLD CREATININE-BSD FMLA CKD-EPI: >60 ML/MIN/{1.73_M2}
GLUCOSE SERPL-MCNC: 94 MG/DL (ref 70–99)
HCT VFR BLD AUTO: 45.6 % (ref 40.5–52.5)
HDLC SERPL-MCNC: 49 MG/DL (ref 40–60)
HGB BLD-MCNC: 15.8 G/DL (ref 13.5–17.5)
LDLC SERPL CALC-MCNC: 147 MG/DL
LYMPHOCYTES # BLD: 2 K/UL (ref 1–5.1)
LYMPHOCYTES NFR BLD: 35.6 %
MCH RBC QN AUTO: 32.2 PG (ref 26–34)
MCHC RBC AUTO-ENTMCNC: 34.5 G/DL (ref 31–36)
MCV RBC AUTO: 93.1 FL (ref 80–100)
MONOCYTES # BLD: 0.5 K/UL (ref 0–1.3)
MONOCYTES NFR BLD: 9.6 %
NEUTROPHILS # BLD: 2.9 K/UL (ref 1.7–7.7)
NEUTROPHILS NFR BLD: 52.3 %
PLATELET # BLD AUTO: 199 K/UL (ref 135–450)
PMV BLD AUTO: 7.9 FL (ref 5–10.5)
POTASSIUM SERPL-SCNC: 4.4 MMOL/L (ref 3.5–5.1)
PROT SERPL-MCNC: 6.8 G/DL (ref 6.4–8.2)
RBC # BLD AUTO: 4.9 M/UL (ref 4.2–5.9)
SODIUM SERPL-SCNC: 140 MMOL/L (ref 136–145)
TRIGL SERPL-MCNC: 140 MG/DL (ref 0–150)
VLDLC SERPL CALC-MCNC: 28 MG/DL
WBC # BLD AUTO: 5.5 K/UL (ref 4–11)

## 2024-02-28 ENCOUNTER — E-VISIT (OUTPATIENT)
Dept: FAMILY MEDICINE CLINIC | Age: 39
End: 2024-02-28
Payer: COMMERCIAL

## 2024-02-28 DIAGNOSIS — J06.9 VIRAL URI: Primary | ICD-10-CM

## 2024-02-28 PROCEDURE — 99422 OL DIG E/M SVC 11-20 MIN: CPT | Performed by: FAMILY MEDICINE

## 2024-02-28 ASSESSMENT — LIFESTYLE VARIABLES: SMOKING_STATUS: NO, I'VE NEVER SMOKED

## 2024-02-28 NOTE — PROGRESS NOTES
Nacho William (:  1985) is a 38 y.o. male,Established patient, here for evaluation of the following chief complaint(s):  No chief complaint on file.         ASSESSMENT/PLAN:  URI  Ill for the past 2 days  Not sure of the symptoms    No follow-ups on file.         Subjective   SUBJECTIVE/OBJECTIVE:  HPI  URI  2 days?  Patient stated he was ill due to his kids being sick.     Review of Systems  See above    Objective   Physical Exam  NO PE    On this date 2024 I have spent 11 minutes reviewing previous notes, test results and face to face with the patient discussing the diagnosis and importance of compliance with the treatment plan as well as documenting on the day of the visit.      An electronic signature was used to authenticate this note.    --Speedy Ambrose, DO

## 2024-02-29 ENCOUNTER — OFFICE VISIT (OUTPATIENT)
Dept: FAMILY MEDICINE CLINIC | Age: 39
End: 2024-02-29
Payer: COMMERCIAL

## 2024-02-29 ENCOUNTER — NURSE ONLY (OUTPATIENT)
Dept: FAMILY MEDICINE CLINIC | Age: 39
End: 2024-02-29
Payer: COMMERCIAL

## 2024-02-29 VITALS
SYSTOLIC BLOOD PRESSURE: 130 MMHG | TEMPERATURE: 99 F | WEIGHT: 260 LBS | DIASTOLIC BLOOD PRESSURE: 92 MMHG | BODY MASS INDEX: 36.26 KG/M2

## 2024-02-29 DIAGNOSIS — B96.89 ACUTE BACTERIAL SINUSITIS: Primary | ICD-10-CM

## 2024-02-29 DIAGNOSIS — J01.90 ACUTE BACTERIAL SINUSITIS: Primary | ICD-10-CM

## 2024-02-29 DIAGNOSIS — R69 ILLNESS: Primary | ICD-10-CM

## 2024-02-29 LAB
INFLUENZA A ANTIBODY: NEGATIVE
INFLUENZA B ANTIBODY: NEGATIVE

## 2024-02-29 PROCEDURE — 99213 OFFICE O/P EST LOW 20 MIN: CPT | Performed by: FAMILY MEDICINE

## 2024-02-29 PROCEDURE — 87804 INFLUENZA ASSAY W/OPTIC: CPT | Performed by: FAMILY MEDICINE

## 2024-02-29 RX ORDER — BENZONATATE 200 MG/1
200 CAPSULE ORAL 3 TIMES DAILY PRN
Qty: 30 CAPSULE | Refills: 0 | Status: SHIPPED | OUTPATIENT
Start: 2024-02-29 | End: 2024-03-10

## 2024-02-29 NOTE — PROGRESS NOTES
Patient here for covid and flu test. Patient complains of on and off fever, cough and slight congestion x 5 days    Covid pending  Flu negative

## 2024-03-01 LAB — SARS-COV-2 RNA RESP QL NAA+PROBE: NOT DETECTED

## 2024-03-01 RX ORDER — AZITHROMYCIN 250 MG/1
TABLET, FILM COATED ORAL
Qty: 6 TABLET | Refills: 0 | Status: SHIPPED | OUTPATIENT
Start: 2024-03-01 | End: 2024-03-11

## 2024-03-01 NOTE — PROGRESS NOTES
Left Ear: External ear normal.   Eyes:      Pupils: Pupils are equal, round, and reactive to light.   Neck:      Thyroid: No thyromegaly.   Cardiovascular:      Rate and Rhythm: Normal rate and regular rhythm.      Heart sounds: No murmur heard.  Pulmonary:      Effort: Pulmonary effort is normal.      Breath sounds: Normal breath sounds. No wheezing or rales.   Abdominal:      General: Bowel sounds are normal.      Palpations: Abdomen is soft.      Tenderness: There is no abdominal tenderness.   Musculoskeletal:         General: Normal range of motion.      Cervical back: Neck supple.   Lymphadenopathy:      Cervical: No cervical adenopathy.   Skin:     Findings: No rash.   Neurological:      Mental Status: He is alert and oriented to person, place, and time.   Psychiatric:         Behavior: Behavior normal.         Judgment: Judgment normal.         ASSESSMENT/PLAN:  1. Acute bacterial sinusitis  Antibiotic provided for 5 days  Push fluids and rest  Use Tylenol/Ibuprofen for pain  Please rtc if not improved.       No follow-ups on file.

## 2024-05-07 NOTE — PATIENT INSTRUCTIONS
Thank you for coming. Please take you medications as prescribed. Please continue to eat a balanced diet and exercise. Labs were obtained and we will contact you with the results. If you have questions please let me know via phone or email. yes

## 2024-11-24 ENCOUNTER — OFFICE VISIT (OUTPATIENT)
Age: 39
End: 2024-11-24

## 2024-11-24 VITALS
HEIGHT: 71 IN | BODY MASS INDEX: 38.22 KG/M2 | HEART RATE: 68 BPM | WEIGHT: 273 LBS | SYSTOLIC BLOOD PRESSURE: 115 MMHG | OXYGEN SATURATION: 94 % | TEMPERATURE: 98.2 F | DIASTOLIC BLOOD PRESSURE: 82 MMHG

## 2024-11-24 DIAGNOSIS — J02.9 ACUTE PHARYNGITIS, UNSPECIFIED ETIOLOGY: Primary | ICD-10-CM

## 2024-11-24 DIAGNOSIS — J02.9 SORE THROAT: ICD-10-CM

## 2024-11-24 DIAGNOSIS — R21 RASH: ICD-10-CM

## 2024-11-24 LAB — S PYO AG THROAT QL: NORMAL

## 2024-11-24 RX ORDER — AZITHROMYCIN 500 MG/1
500 TABLET, FILM COATED ORAL DAILY
Qty: 5 TABLET | Refills: 0 | Status: SHIPPED | OUTPATIENT
Start: 2024-11-24 | End: 2024-11-29

## 2025-01-29 ENCOUNTER — TELEMEDICINE (OUTPATIENT)
Dept: PRIMARY CARE CLINIC | Age: 40
End: 2025-01-29
Payer: COMMERCIAL

## 2025-01-29 ENCOUNTER — PATIENT MESSAGE (OUTPATIENT)
Dept: PRIMARY CARE CLINIC | Age: 40
End: 2025-01-29

## 2025-01-29 DIAGNOSIS — K12.2 UVULITIS: Primary | ICD-10-CM

## 2025-01-29 PROCEDURE — 99213 OFFICE O/P EST LOW 20 MIN: CPT | Performed by: FAMILY MEDICINE

## 2025-01-29 RX ORDER — AMOXICILLIN 875 MG/1
875 TABLET, COATED ORAL 2 TIMES DAILY
Qty: 20 TABLET | Refills: 0 | Status: SHIPPED | OUTPATIENT
Start: 2025-01-29 | End: 2025-02-08

## 2025-01-29 NOTE — PROGRESS NOTES
2025    TELEHEALTH EVALUATION -- Audio/Visual    HPI:    Nacho William (:  1985) has requested an audio/video evaluation for the following concern(s):    Patient had a virtual visit today due to upper respiratory infection.  Stated that strep throat is in the house and he has a sore throat at this time.  He has had stat strep throat in the past and feels similar symptoms today  Patient is using over-the-counter medication but is not helping.  Patient has had symptoms for close to a week now.  No fever or chills.  He is unable to get tested at this time.  Today he denied chest pain, shortness of breath, nausea, vomiting, diarrhea.     Review of Systems   Constitutional:  Negative for activity change, fatigue, fever and unexpected weight change.   HENT:  Positive for congestion and sore throat.    Eyes:  Negative for visual disturbance.   Respiratory:  Negative for cough, chest tightness, shortness of breath and wheezing.    Cardiovascular:  Negative for chest pain, palpitations and leg swelling.   Gastrointestinal:  Negative for abdominal pain, diarrhea, nausea and vomiting.   Skin:  Negative for rash.   Neurological:  Negative for dizziness, syncope, light-headedness and headaches.   Psychiatric/Behavioral:  Negative for dysphoric mood. The patient is not nervous/anxious.        Prior to Visit Medications    Medication Sig Taking? Authorizing Provider   amoxicillin (AMOXIL) 875 MG tablet Take 1 tablet by mouth 2 times daily for 10 days Yes Speedy Ambrose C, DO       Social History     Tobacco Use    Smoking status: Former     Current packs/day: 0.00     Average packs/day: 1 pack/day for 5.0 years (5.0 ttl pk-yrs)     Types: Cigarettes     Start date: 2001     Quit date: 2006     Years since quittin.2    Smokeless tobacco: Current     Types: Chew   Vaping Use    Vaping status: Never Used   Substance Use Topics    Alcohol use: Yes     Alcohol/week: 5.0 standard drinks of alcohol     Types:

## 2025-01-31 ASSESSMENT — ENCOUNTER SYMPTOMS
WHEEZING: 0
ABDOMINAL PAIN: 0
SORE THROAT: 1
NAUSEA: 0
DIARRHEA: 0
SHORTNESS OF BREATH: 0
VOMITING: 0
CHEST TIGHTNESS: 0
COUGH: 0

## 2025-06-02 ENCOUNTER — TELEMEDICINE (OUTPATIENT)
Dept: PRIMARY CARE CLINIC | Age: 40
End: 2025-06-02
Payer: COMMERCIAL

## 2025-06-02 DIAGNOSIS — J06.9 UPPER RESPIRATORY TRACT INFECTION, UNSPECIFIED TYPE: Primary | ICD-10-CM

## 2025-06-02 PROCEDURE — 99214 OFFICE O/P EST MOD 30 MIN: CPT | Performed by: FAMILY MEDICINE

## 2025-06-02 RX ORDER — BENZONATATE 100 MG/1
100 CAPSULE ORAL 3 TIMES DAILY PRN
Qty: 30 CAPSULE | Refills: 0 | Status: SHIPPED | OUTPATIENT
Start: 2025-06-02 | End: 2025-06-12

## 2025-06-02 RX ORDER — LORATADINE AND PSEUDOEPHEDRINE SULFATE 5; 120 MG/1; MG/1
1 TABLET, EXTENDED RELEASE ORAL 2 TIMES DAILY
Qty: 20 TABLET | Refills: 0 | Status: SHIPPED | OUTPATIENT
Start: 2025-06-02

## 2025-06-02 NOTE — PROGRESS NOTES
Patient presented with a 2 to 3-week history of nasal congestion and nighttime cough.  2025    TELEHEALTH EVALUATION -- Audio/Visual    HPI:    Nacho William (:  1985) has requested an audio/video evaluation for the following concern(s):    Patient presented for virtual visit with a 2 to 3-week history of nasal congestion and nighttime cough.  He added that overall he does not feel bad particular during the day when he lies down at night he has congestion maybe some drainage and ends up coughing.  He states the coughing can be intense at times.  He denies sinus pressure or pain at this point and is leery of taking an antibiotic due to the fact that he has abdominal issues in the past.  He has taken Allegra but it has not helped and is asking for additional medication to help manage symptoms.  He believes it secondary to an allergy possible due to the increased pollen in the air.    Review of Systems   Constitutional:  Positive for fatigue. Negative for activity change and fever.   HENT:  Positive for congestion. Negative for sinus pressure and sinus pain.    Respiratory:  Positive for cough. Negative for choking and shortness of breath.    Cardiovascular:  Negative for chest pain and palpitations.   Gastrointestinal:  Negative for abdominal pain, diarrhea, nausea and vomiting.       Prior to Visit Medications    Medication Sig Taking? Authorizing Provider   loratadine-pseudoephedrine (CLARITIN-D 12 HOUR) 5-120 MG per extended release tablet Take 1 tablet by mouth 2 times daily Yes Speedy Ambrose DO   benzonatate (TESSALON) 100 MG capsule Take 1 capsule by mouth 3 times daily as needed for Cough Yes Speedy Ambrose DO       Social History     Tobacco Use    Smoking status: Former     Current packs/day: 0.00     Average packs/day: 1 pack/day for 5.0 years (5.0 ttl pk-yrs)     Types: Cigarettes     Start date: 2001     Quit date: 2006     Years since quittin.5    Smokeless tobacco:

## 2025-06-03 ASSESSMENT — ENCOUNTER SYMPTOMS
NAUSEA: 0
SINUS PRESSURE: 0
COUGH: 1
ABDOMINAL PAIN: 0
SHORTNESS OF BREATH: 0
DIARRHEA: 0
CHOKING: 0
SINUS PAIN: 0
VOMITING: 0

## (undated) DEVICE — ENDOSCOPY KIT: Brand: MEDLINE INDUSTRIES, INC.